# Patient Record
Sex: MALE | Race: WHITE | NOT HISPANIC OR LATINO | Employment: FULL TIME | URBAN - METROPOLITAN AREA
[De-identification: names, ages, dates, MRNs, and addresses within clinical notes are randomized per-mention and may not be internally consistent; named-entity substitution may affect disease eponyms.]

---

## 2018-09-05 ENCOUNTER — OFFICE VISIT (OUTPATIENT)
Dept: UROLOGY | Facility: CLINIC | Age: 48
End: 2018-09-05
Payer: COMMERCIAL

## 2018-09-05 ENCOUNTER — LAB VISIT (OUTPATIENT)
Dept: LAB | Facility: HOSPITAL | Age: 48
End: 2018-09-05
Attending: UROLOGY
Payer: COMMERCIAL

## 2018-09-05 VITALS — BODY MASS INDEX: 35.31 KG/M2 | HEIGHT: 71 IN | WEIGHT: 252.19 LBS

## 2018-09-05 DIAGNOSIS — Z12.5 SCREENING FOR PROSTATE CANCER: ICD-10-CM

## 2018-09-05 DIAGNOSIS — N52.9 IMPOTENCE: ICD-10-CM

## 2018-09-05 DIAGNOSIS — R35.0 INCREASED URINARY FREQUENCY: ICD-10-CM

## 2018-09-05 DIAGNOSIS — N40.1 ENLARGED PROSTATE WITH URINARY OBSTRUCTION: ICD-10-CM

## 2018-09-05 DIAGNOSIS — E29.1 HYPOGONADISM MALE: ICD-10-CM

## 2018-09-05 DIAGNOSIS — N13.8 ENLARGED PROSTATE WITH URINARY OBSTRUCTION: ICD-10-CM

## 2018-09-05 DIAGNOSIS — E29.1 HYPOGONADISM MALE: Primary | ICD-10-CM

## 2018-09-05 DIAGNOSIS — R35.1 NOCTURIA MORE THAN TWICE PER NIGHT: ICD-10-CM

## 2018-09-05 LAB
BILIRUB SERPL-MCNC: NORMAL MG/DL
BLOOD URINE, POC: NORMAL
COLOR, POC UA: YELLOW
COMPLEXED PSA SERPL-MCNC: 0.34 NG/ML
GLUCOSE UR QL STRIP: NORMAL
KETONES UR QL STRIP: NORMAL
LEUKOCYTE ESTERASE URINE, POC: NORMAL
NITRITE, POC UA: NORMAL
PH, POC UA: 6
PROTEIN, POC: NORMAL
SPECIFIC GRAVITY, POC UA: 1.02
TESTOST SERPL-MCNC: 223 NG/DL
UROBILINOGEN, POC UA: NORMAL

## 2018-09-05 PROCEDURE — 36415 COLL VENOUS BLD VENIPUNCTURE: CPT | Mod: PO

## 2018-09-05 PROCEDURE — 99204 OFFICE O/P NEW MOD 45 MIN: CPT | Mod: 25,S$GLB,, | Performed by: UROLOGY

## 2018-09-05 PROCEDURE — 81002 URINALYSIS NONAUTO W/O SCOPE: CPT | Mod: S$GLB,,, | Performed by: UROLOGY

## 2018-09-05 PROCEDURE — 99999 PR PBB SHADOW E&M-NEW PATIENT-LVL II: CPT | Mod: PBBFAC,,, | Performed by: UROLOGY

## 2018-09-05 PROCEDURE — 84403 ASSAY OF TOTAL TESTOSTERONE: CPT

## 2018-09-05 PROCEDURE — 3008F BODY MASS INDEX DOCD: CPT | Mod: CPTII,S$GLB,, | Performed by: UROLOGY

## 2018-09-05 PROCEDURE — 84153 ASSAY OF PSA TOTAL: CPT

## 2018-09-05 RX ORDER — LOSARTAN POTASSIUM AND HYDROCHLOROTHIAZIDE 12.5; 5 MG/1; MG/1
TABLET ORAL
Refills: 5 | COMMUNITY
Start: 2018-08-13 | End: 2019-07-15

## 2018-09-05 RX ORDER — IBUPROFEN 800 MG/1
800 TABLET ORAL EVERY 6 HOURS PRN
Refills: 0 | COMMUNITY
Start: 2018-06-22 | End: 2019-07-15 | Stop reason: ALTCHOICE

## 2018-09-05 RX ORDER — ALFUZOSIN HYDROCHLORIDE 10 MG/1
10 TABLET, EXTENDED RELEASE ORAL
Qty: 30 TABLET | Refills: 12 | Status: SHIPPED | OUTPATIENT
Start: 2018-09-05 | End: 2019-07-15 | Stop reason: ALTCHOICE

## 2018-09-05 RX ORDER — ESCITALOPRAM OXALATE 20 MG/1
20 TABLET ORAL
COMMUNITY
Start: 2017-06-26 | End: 2019-07-15 | Stop reason: ALTCHOICE

## 2018-09-05 RX ORDER — AMLODIPINE BESYLATE 5 MG/1
10 TABLET ORAL DAILY
COMMUNITY
Start: 2017-06-26 | End: 2020-12-02

## 2018-09-05 RX ORDER — NAPROXEN SODIUM 220 MG/1
TABLET, FILM COATED ORAL
COMMUNITY
Start: 2018-05-05 | End: 2020-12-02 | Stop reason: CLARIF

## 2018-09-05 NOTE — PROGRESS NOTES
Subjective:       Patient ID: Micha Bolton is a 48 y.o. male.    Chief Complaint: Flank Pain and Low Testosterone    HPI     48 year old with a history of low testosterone and kidney stones.   He was previously seen 3 years ago.  He was on IM testosterone very briefly..only 2 shots.  He has a history of low back pain but he complains of worsening urinary frequency.  He denies gross hematuria and dysuria.  His UA is clear.  Nocturia 3-4 but getting worse.  He complains of decreased energy .  ED has gotten worse.  OTC meds.  No family history of prostate cancer.  Urine dipstick shows negative for all components.    History reviewed. No pertinent past medical history.    History reviewed. No pertinent surgical history.      Current Outpatient Medications:     amLODIPine (NORVASC) 5 MG tablet, Take 5 mg by mouth., Disp: , Rfl:     aspirin 81 MG Chew, Chew 1 tablet(s) every day by oral route., Disp: , Rfl:     escitalopram oxalate (LEXAPRO) 20 MG tablet, Take 20 mg by mouth., Disp: , Rfl:     ibuprofen (ADVIL,MOTRIN) 800 MG tablet, Take 800 mg by mouth every 6 (six) hours as needed., Disp: , Rfl: 0    losartan-hydrochlorothiazide 50-12.5 mg (HYZAAR) 50-12.5 mg per tablet, TAKE 1 TABLET BY MOUTH 1 TIME A DAY, Disp: , Rfl: 5    alfuzosin (UROXATRAL) 10 mg Tb24, Take 1 tablet (10 mg total) by mouth daily with dinner or evening meal., Disp: 30 tablet, Rfl: 12      Review of Systems   Constitutional: Negative for fever.   Eyes: Negative for visual disturbance.   Respiratory: Negative for shortness of breath.    Cardiovascular: Negative for chest pain.   Gastrointestinal: Negative for nausea and vomiting.   Genitourinary: Positive for frequency. Negative for dysuria, flank pain and hematuria.   Musculoskeletal: Positive for gait problem.   Skin: Negative for rash.   Neurological: Negative for seizures.   Psychiatric/Behavioral: Negative for confusion.       Objective:      Physical Exam   Constitutional: He is  oriented to person, place, and time. He appears well-developed and well-nourished.   HENT:   Head: Normocephalic and atraumatic.   Eyes: Conjunctivae are normal.   Cardiovascular: Normal rate.   Pulmonary/Chest: Effort normal.   Abdominal: Hernia confirmed negative in the right inguinal area and confirmed negative in the left inguinal area.   Genitourinary: Testes normal and penis normal. Rectal exam shows no mass and anal tone normal. Prostate is enlarged (40g, s/s/a). Prostate is not tender.   Musculoskeletal: Normal range of motion. He exhibits no edema.   Neurological: He is alert and oriented to person, place, and time.   Skin: Skin is warm and dry. No rash noted.   Psychiatric: He has a normal mood and affect.   Vitals reviewed.      Assessment:       1. Hypogonadism male    2. Enlarged prostate with urinary obstruction    3. Increased urinary frequency    4. Nocturia more than twice per night    5. Screening for prostate cancer    6. Impotence        Plan:       Hypogonadism male  -     POCT URINE DIPSTICK WITHOUT MICROSCOPE  -     Testosterone; Future; Expected date: 09/05/2018    Enlarged prostate with urinary obstruction    Increased urinary frequency    Nocturia more than twice per night    Screening for prostate cancer  -     PSA, Screening; Future; Expected date: 09/05/2018    Impotence    Other orders  -     alfuzosin (UROXATRAL) 10 mg Tb24; Take 1 tablet (10 mg total) by mouth daily with dinner or evening meal.  Dispense: 30 tablet; Refill: 12      Baseline PSA and Testosterone level.  Rec Trial Uroxatral.   Will resume IM testosterone pending lab results.

## 2018-09-21 ENCOUNTER — TELEPHONE (OUTPATIENT)
Dept: UROLOGY | Facility: CLINIC | Age: 48
End: 2018-09-21

## 2018-09-21 DIAGNOSIS — E29.1 MALE HYPOGONADISM: Primary | ICD-10-CM

## 2018-09-21 NOTE — TELEPHONE ENCOUNTER
----- Message from Danay Pulido sent at 9/21/2018 11:05 AM CDT -----  Contact: patient  Patient requesting call back regarding test results. Please call [phone]

## 2018-09-24 ENCOUNTER — TELEPHONE (OUTPATIENT)
Dept: UROLOGY | Facility: CLINIC | Age: 48
End: 2018-09-24

## 2018-09-25 NOTE — TELEPHONE ENCOUNTER
Spoke to Dr. Gongora and got the verbal order for the testosterone. I will order on pt's nurse visit apt today. Not able to order ahead of time.

## 2018-09-25 NOTE — TELEPHONE ENCOUNTER
Pt had an office visit on 9/21/18. Your note states will start IM treatment pending the testosterone levels. Pt has a nurse visit apt for today.

## 2019-07-15 PROBLEM — M75.02 SECONDARY ADHESIVE CAPSULITIS OF LEFT SHOULDER: Status: ACTIVE | Noted: 2019-07-15

## 2019-07-15 PROBLEM — M19.012 ARTHRITIS OF LEFT SHOULDER REGION: Status: ACTIVE | Noted: 2019-07-15

## 2019-09-04 ENCOUNTER — TELEPHONE (OUTPATIENT)
Dept: PHYSICAL MEDICINE AND REHAB | Facility: CLINIC | Age: 49
End: 2019-09-04

## 2019-09-04 ENCOUNTER — INITIAL CONSULT (OUTPATIENT)
Dept: PHYSICAL MEDICINE AND REHAB | Facility: CLINIC | Age: 49
End: 2019-09-04
Payer: COMMERCIAL

## 2019-09-04 VITALS
WEIGHT: 245 LBS | HEART RATE: 70 BPM | DIASTOLIC BLOOD PRESSURE: 100 MMHG | HEIGHT: 71 IN | SYSTOLIC BLOOD PRESSURE: 161 MMHG | BODY MASS INDEX: 34.3 KG/M2

## 2019-09-04 DIAGNOSIS — G89.29 CHRONIC LEFT SHOULDER PAIN: Primary | ICD-10-CM

## 2019-09-04 DIAGNOSIS — M25.512 CHRONIC LEFT SHOULDER PAIN: Primary | ICD-10-CM

## 2019-09-04 PROCEDURE — 99999 PR PBB SHADOW E&M-EST. PATIENT-LVL III: ICD-10-PCS | Mod: PBBFAC,,, | Performed by: PHYSICAL MEDICINE & REHABILITATION

## 2019-09-04 PROCEDURE — 99243 OFF/OP CNSLTJ NEW/EST LOW 30: CPT | Mod: S$GLB,,, | Performed by: PHYSICAL MEDICINE & REHABILITATION

## 2019-09-04 PROCEDURE — 99999 PR PBB SHADOW E&M-EST. PATIENT-LVL III: CPT | Mod: PBBFAC,,, | Performed by: PHYSICAL MEDICINE & REHABILITATION

## 2019-09-04 PROCEDURE — 99243 PR OFFICE CONSULTATION,LEVEL III: ICD-10-PCS | Mod: S$GLB,,, | Performed by: PHYSICAL MEDICINE & REHABILITATION

## 2019-09-04 RX ORDER — LOSARTAN POTASSIUM AND HYDROCHLOROTHIAZIDE 12.5; 5 MG/1; MG/1
TABLET ORAL
Refills: 5 | COMMUNITY
Start: 2019-08-29 | End: 2020-02-18

## 2019-09-04 RX ORDER — TADALAFIL 20 MG/1
TABLET ORAL
Refills: 0 | COMMUNITY
Start: 2019-08-22 | End: 2020-12-02

## 2019-09-04 RX ORDER — LIDOCAINE HYDROCHLORIDE 10 MG/ML
1 INJECTION, SOLUTION EPIDURAL; INFILTRATION; INTRACAUDAL; PERINEURAL ONCE
Status: CANCELLED | OUTPATIENT
Start: 2019-09-04 | End: 2019-09-04

## 2019-09-04 RX ORDER — SODIUM CHLORIDE, SODIUM LACTATE, POTASSIUM CHLORIDE, CALCIUM CHLORIDE 600; 310; 30; 20 MG/100ML; MG/100ML; MG/100ML; MG/100ML
INJECTION, SOLUTION INTRAVENOUS CONTINUOUS
Status: CANCELLED | OUTPATIENT
Start: 2019-09-04

## 2019-09-04 RX ORDER — MIDAZOLAM HYDROCHLORIDE 1 MG/ML
2 INJECTION INTRAMUSCULAR; INTRAVENOUS ONCE AS NEEDED
Status: CANCELLED | OUTPATIENT
Start: 2019-09-04 | End: 2031-01-31

## 2019-09-04 NOTE — TELEPHONE ENCOUNTER
"Patient called [facility] who told him that the codes for the dx block and the ablation for the knee are covered under his policy. But he states they need someone from the office to call the ins company and go through the whole approval process with them. I explained to him that [employer] does not cover these procedures. They simply do not cover them for the peripheral joints. He states he wants me to go through the approval process then let them deny it then he can get on the phone with someone. I explained to him that [employer] will not under any policy cover these procedures and I gave him the cash price for the procedure because we already know they will not approve the procedure. He stated again that he wants me to get the approval started so that he can get on the phone with them when they deny the procedure. I explained to him again that his ins simply does not cover these procedures. He stated "I guess we won't do anything" and hung up  "

## 2019-09-04 NOTE — LETTER
September 4, 2019      [place]acio [place]  [id number] [address] [facility]  [facility] [address]           [facility]  [address] [facility] [address]  Phone: [phone]  Fax: [phone]          Patient: Micha Bolton   MR Number: [id number]   YOB: 1970   Date of Visit: 9/4/2019       Dear Dr. Horacio Rodríguez II:    Thank you for referring Micha Bolton to me for evaluation. Attached you will find relevant portions of my assessment and plan of care.    If you have questions, please do not hesitate to call me. I look forward to following Micha Bolton along with you.    Sincerely,    Raghu Lala MD    Enclosure  CC:  No Recipients    If you would like to receive this communication electronically, please contact [email or web address] or [phone] to request more information on [employer] access.    For providers and/or their staff who would like to refer a patient to [facility], please contact us through our one-stop-shop provider referral line, [facility], at [phone].    If you feel you have received this communication in error or would no longer like to receive these types of communications, please e-mail [email or web address]

## 2019-09-04 NOTE — TELEPHONE ENCOUNTER
I explained to patient earlier that we submit this for surgery and the referral dept gets the auth for the procedures. I cannot get on the phone with ins. Company while in clinic. I submitted this for the Sept 13th to get approval or denial from ins. company

## 2019-09-04 NOTE — PROGRESS NOTES
[facility]    Consulting Provider: Horacio Rodríguez II, *    CHIEF COMPLAINT:   Chief Complaint   Patient presents with    Shoulder Pain     left shoudler pain     HISTORY OF PRESENT ILLNESS: Micha Bolton is a 49 y.o. male who presents to me for evaluation of Left shoulder pain. He is left-handed. He had a posterior labral tear that was repaired in February by Dr. Thompson. He completed PT in April, but he continues to have 7-8/10 pain in his L shoulder as well as significantly reduced ROM. He was seen by Dr. Rodríguez in July where he was found to have OA of L shoulder as well as adhesive capsulitis. He was given a steroid injection and referred to PT. He reports minimal, if any, improvement following injection and has not returned to PT. He was referred for evaluation for possible suprascapular nerve block.     Review of Systems   Constitutional: Negative for fever.   HENT: Negative for drooling.    Eyes: Negative for discharge.   Respiratory: Negative for choking.    Cardiovascular: Negative for chest pain.   Genitourinary: Negative for flank pain.   Skin: Negative for wound.   Allergic/Immunologic: Negative for immunocompromised state.   Neurological: Negative for tremors and syncope.   Psychiatric/Behavioral: Negative for behavioral problems.     Past Medical History:   Past Medical History:   Diagnosis Date    Hypertension        Past Surgical History:   Past Surgical History:   Procedure Laterality Date    LAPAROSCOPIC REPAIR OF INGUINAL HERNIA Bilateral 2009    SHOULDER SURGERY Left 02/18/2019    posterior labral repair; extensive debridement of synovium and cuff with SAD       Family History: History reviewed. No pertinent family history.    Medications:   Current Outpatient Medications on File Prior to Visit   Medication Sig Dispense Refill    amLODIPine (NORVASC) 5 MG tablet Take 5 mg by mouth.      aspirin 81 MG Chew Chew 1 tablet(s) every day by oral route.       "losartan-hydrochlorothiazide 50-12.5 mg (HYZAAR) 50-12.5 mg per tablet TAKE 1 TABLET BY MOUTH 1 TIME A DAY  5    sertraline (ZOLOFT) 25 MG tablet Take 25 mg by mouth once daily.  3    tadalafil (CIALIS) 20 MG Tab TAKE 1 TABLET BY MOUTH 1 TIME A DAY as needed for 3 days  0    valsartan-hydrochlorothiazide (DIOVAN-HCT) 160-12.5 mg per tablet Take 1 tablet by mouth.       No current facility-administered medications on file prior to visit.        Allergies:   Review of patient's allergies indicates:   Allergen Reactions    Pcn [penicillins]        Social History:   Social History     Socioeconomic History    Marital status:      Spouse name: Not on file    Number of children: Not on file    Years of education: Not on file    Highest education level: Not on file   Occupational History    Not on file   Social Needs    Financial resource strain: Not on file    Food insecurity:     Worry: Not on file     Inability: Not on file    Transportation needs:     Medical: Not on file     Non-medical: Not on file   Tobacco Use    Smoking status: Never Smoker    Smokeless tobacco: Never Used   Substance and Sexual Activity    Alcohol use: No     Frequency: Never    Drug use: No    Sexual activity: Yes     Partners: Female   Lifestyle    Physical activity:     Days per week: Not on file     Minutes per session: Not on file    Stress: Not on file   Relationships    Social connections:     Talks on phone: Not on file     Gets together: Not on file     Attends Latter day service: Not on file     Active member of club or organization: Not on file     Attends meetings of clubs or organizations: Not on file     Relationship status: Not on file   Other Topics Concern    Not on file   Social History Narrative    Not on file     PHYSICAL EXAMINATION:   General    Vitals:    09/04/19 0735   BP: (!) 161/100   Pulse: 70   Weight: 111.1 kg (245 lb)   Height: 5' 11" (1.803 m)     Constitutional: Oriented to person, " place, and time. No apparent distress. Pleasant.  HENT:   Head: Normocephalic and atraumatic.   Eyes: Right eye exhibits no discharge. Left eye exhibits no discharge. No scleral icterus.   Pulmonary/Chest: Effort normal. No respiratory distress.   Abdominal: There is no guarding.   Neurological: Alert and oriented to person, place, and time.   Psychiatric: Behavior is normal.   Left Shoulder Exam     Tenderness   Left shoulder tenderness location: Mild tenderness about the posterior shoulder girdle.    Range of Motion   Active abduction: 80   Left shoulder passive abduction: 45.   Left shoulder external rotation: 5.   Forward flexion: 80   Internal rotation 90 degrees: 10     Muscle Strength   Abduction: 4/5   Internal rotation: 4/5   External rotation: 4/5   Biceps: 5/5     Other   Erythema: absent  Scars: present  Sensation: normal  Pulse: present     Comments:  Special tests limited by significant loss of range of motion        INSPECTION: There is no swelling, ecchymoses, erythema or gross deformity about the left shoulder.    Imaging  X-ray of the Left shoulder from 7/15/19:     1. No fracture is identified.    2. There are degenerative changes present with findings consisting of osteophyte formation, joint space narrowing, and subchondral cyst formation.    3. There also is some irregularity of the joint surfaces.    MRI of the Left shoulder from 2/1/19:    1. There is hypertrophic degeneration about the AC joint.  2. The rotator cuff musculature is grossly intact.  3. There is some labral tearing appearance both anterior and more pronounced posteriorly with adjacent cortical irregularity indicative of previous osseous remote injury mid to inferior aspect of the glenoid predominantly.  No acute osseous signal is currently appreciated.  Correlate if there is history of previous injury or dislocation.  4. There is glenohumeral joint space narrowing and spurring posterior inferiorly predominant.    Data Reviewed:  "X-ray, MRI    Supportive Actions: Independent visualization of images or test specimens    ASSESSMENT:   1. Chronic shoulder pain, unspecified laterality      PLAN:     1. Time was spent reviewing the above diagnosis in depth with Micha mascorro, including acute management and rehabilitation.     2. Recommended diagnostic suprascapular nerve block to evaluate for possible nerve ablation. Unfortunately, patient has [employer] insurance, and these procedures may not be covered. He is interested in having them performed as they would likely help to relieve his pain, help to accelerate his progress with PT in terms of continuing to improve his strength and ROM, and may reduce his need for pain medicine or further surgical procedures.     3. He would like to speak to his insurance company to check coverage and will contact us after doing so to make a follow-up appointment.  He was also issued the out-of-pocket cost should his insurance not pay for the procedure.    This is a consult from Dr. Horacio Rodríguez II. Please see the "Communications" section of Epic to see how the consulting physician received the report of today's findings and recommendations. If it's an [facility] physician, it will be forwarded to his/her "in basket".    The above note was completed, in part, with the aid of Dragon dictation software/hardware. Translation errors may be present.    "

## 2019-09-04 NOTE — TELEPHONE ENCOUNTER
----- Message from Cheyenne Meza sent at 9/4/2019  8:48 AM CDT -----  Contact: Sharron najera/ANGÉLICA Mcdermott is requesting a call back to get a diagnoses code for the procedure the patient is wanting to schedule.  Call back at [phone].  Thanks

## 2019-09-12 ENCOUNTER — TELEPHONE (OUTPATIENT)
Dept: SURGERY | Facility: HOSPITAL | Age: 49
End: 2019-09-12

## 2019-09-12 NOTE — TELEPHONE ENCOUNTER
When doing pre op call, patient stated that he thought he was supposed to hear from the office about his insurance approval for this procedure. He states he never was contacted, so he would be unable to make the procedure tomorrow. He asked that someone from the office contact him to reschedule and work with him and his insurance. Thank you

## 2020-02-18 PROBLEM — M19.012 OSTEOARTHRITIS OF LEFT SHOULDER: Status: ACTIVE | Noted: 2020-02-18

## 2020-12-17 PROBLEM — M19.012 PRIMARY LOCALIZED OSTEOARTHROSIS OF LEFT SHOULDER: Status: ACTIVE | Noted: 2020-12-17

## 2020-12-29 PROBLEM — Z96.612 STATUS POST TOTAL SHOULDER ARTHROPLASTY, LEFT: Status: ACTIVE | Noted: 2020-12-29

## 2021-01-29 ENCOUNTER — EXTERNAL HOME HEALTH (OUTPATIENT)
Dept: HOME HEALTH SERVICES | Facility: HOSPITAL | Age: 51
End: 2021-01-29

## 2021-05-10 ENCOUNTER — PATIENT MESSAGE (OUTPATIENT)
Dept: RESEARCH | Facility: HOSPITAL | Age: 51
End: 2021-05-10

## 2021-07-21 PROBLEM — K80.20 GALLSTONES: Status: ACTIVE | Noted: 2021-07-21

## 2023-03-17 PROBLEM — M19.011 PRIMARY OSTEOARTHRITIS OF RIGHT SHOULDER: Status: ACTIVE | Noted: 2023-03-17

## 2023-05-08 ENCOUNTER — OFFICE VISIT (OUTPATIENT)
Dept: OTOLARYNGOLOGY | Facility: CLINIC | Age: 53
End: 2023-05-08
Payer: COMMERCIAL

## 2023-05-08 VITALS — HEIGHT: 71 IN | WEIGHT: 233.69 LBS | BODY MASS INDEX: 32.72 KG/M2

## 2023-05-08 DIAGNOSIS — Z78.9 INTOLERANCE OF CONTINUOUS POSITIVE AIRWAY PRESSURE (CPAP) VENTILATION: ICD-10-CM

## 2023-05-08 DIAGNOSIS — G47.33 OSA (OBSTRUCTIVE SLEEP APNEA): Primary | ICD-10-CM

## 2023-05-08 PROCEDURE — 1160F PR REVIEW ALL MEDS BY PRESCRIBER/CLIN PHARMACIST DOCUMENTED: ICD-10-PCS | Mod: CPTII,S$GLB,, | Performed by: OTOLARYNGOLOGY

## 2023-05-08 PROCEDURE — 99999 PR PBB SHADOW E&M-EST. PATIENT-LVL IV: CPT | Mod: PBBFAC,,, | Performed by: OTOLARYNGOLOGY

## 2023-05-08 PROCEDURE — 99204 PR OFFICE/OUTPT VISIT, NEW, LEVL IV, 45-59 MIN: ICD-10-PCS | Mod: S$GLB,,, | Performed by: OTOLARYNGOLOGY

## 2023-05-08 PROCEDURE — 99204 OFFICE O/P NEW MOD 45 MIN: CPT | Mod: S$GLB,,, | Performed by: OTOLARYNGOLOGY

## 2023-05-08 PROCEDURE — 3008F PR BODY MASS INDEX (BMI) DOCUMENTED: ICD-10-PCS | Mod: CPTII,S$GLB,, | Performed by: OTOLARYNGOLOGY

## 2023-05-08 PROCEDURE — 3008F BODY MASS INDEX DOCD: CPT | Mod: CPTII,S$GLB,, | Performed by: OTOLARYNGOLOGY

## 2023-05-08 PROCEDURE — 1159F PR MEDICATION LIST DOCUMENTED IN MEDICAL RECORD: ICD-10-PCS | Mod: CPTII,S$GLB,, | Performed by: OTOLARYNGOLOGY

## 2023-05-08 PROCEDURE — 99999 PR PBB SHADOW E&M-EST. PATIENT-LVL IV: ICD-10-PCS | Mod: PBBFAC,,, | Performed by: OTOLARYNGOLOGY

## 2023-05-08 PROCEDURE — 1160F RVW MEDS BY RX/DR IN RCRD: CPT | Mod: CPTII,S$GLB,, | Performed by: OTOLARYNGOLOGY

## 2023-05-08 PROCEDURE — 1159F MED LIST DOCD IN RCRD: CPT | Mod: CPTII,S$GLB,, | Performed by: OTOLARYNGOLOGY

## 2023-05-08 NOTE — H&P (VIEW-ONLY)
Subjective:       Patient ID: Micha Bolton is a 52 y.o. male.    Chief Complaint: Vik Muse is here for Obstructive sleep apnea and intolerance of CPAP.  Has had HARSHA for years.   Last sleep study: 5/2022. KATHERINE / AHI: 33; oxygen mariluz: 80%  Occurred in all positions.  he has issues with CPAP compliance: he cannot initiate or maintain sleep. Has tried different pressure setting and masks. He has followed with Dr. Piper.  Previous surgical treatments for sleep apnea: none  Body mass index is 32.59 kg/m².      Pertinent medical issues: htn  Anticoagulation: none  Pertinent surgery: none    Patient validated questionnaires (if applicable):      %       No flowsheet data found.  No flowsheet data found.  No flowsheet data found.         Social History     Tobacco Use   Smoking Status Never   Smokeless Tobacco Never     Social History     Substance and Sexual Activity   Alcohol Use Yes    Alcohol/week: 1.0 standard drink    Types: 1 Glasses of wine per week          Objective:        Constitutional:   He is oriented to person, place, and time. He appears well-developed and well-nourished. He appears alert. He is active. Normal speech.      Head:  Normocephalic and atraumatic. Head is without TMJ tenderness. No scars. Salivary glands normal.  Facial strength is normal.      Ears:    Right Ear: No drainage or swelling. No middle ear effusion.   Left Ear: No drainage or swelling.  No middle ear effusion.     Nose:  Septal deviation present. No mucosal edema, rhinorrhea or sinus tenderness. No turbinate hypertrophy.      Mouth/Throat  Oropharynx clear and moist without lesions or asymmetry, normal uvula midline and mirror exam normal. Normal dentition. No uvula swelling, lacerations or trismus. No oropharyngeal exudate. Tonsils present, +1.  Tonsillar erythema, tonsillar exudate.    Guillory II  Difficulty mirror      Neck:  Full range of motion with neck supple and no adenopathy. Thyroid tenderness is  present. No tracheal deviation, no edema, no erythema, normal range of motion, no stridor, no crepitus and no neck rigidity present. No thyroid mass present.     Cardiovascular:    Intact distal pulses and normal pulses.              Pulmonary/Chest:   Effort normal and breath sounds normal. No stridor.     Psychiatric:   His speech is normal and behavior is normal. His mood appears not anxious. His affect is not labile.     Neurological:   He is alert and oriented to person, place, and time. No sensory deficit.     Skin:   No abrasions, lacerations, lesions, or rashes. No abrasion and no bruising noted.       Tests / Results:  Reviewed sleep study from [facility] independently, as noted above     Assessment:       1. HARSHA (obstructive sleep apnea)    2. Intolerance of continuous positive airway pressure (CPAP) ventilation    3. BMI 32.0-32.9,adult          Plan:         Discussed background of HARSHA at length including medical therapy and surgical therapy    Patient interested in [facility]  Few lb weight loss to get BMI < 32  Discussed   DISE (drug induced sleep endoscopy)    I discussed the risks of hypoglossal nerve stimulation including: scar, bleeding, numbness of incision, numbness or weakness of tongue or marginal mandibular nerve, irritation of tongue from stimulation, implant failure, inadequate improvement, need for further procedures, need for removal of implant, infection, pneumothorax, MRI incompatibility.

## 2023-05-08 NOTE — PATIENT INSTRUCTIONS
Information regarding Hypoglossal Nerve Stimulation Therapy:    Hypoglossal Nerve Stimulation Therapy (HGNS) is a surgical treatment for sleep apnea when a patient fails standard positive pressure (CPAP) therapy. This is an effective surgical therapy for sleep apnea with long term effectiveness in many patients.    What is HGNS and how does it work?  The hypoglossal nerve is the nerve that controls nearly all of the tongue movements.   During sleep, the tongue will often fall into the back of the throat. This causes obstruction and apneas by narrowing the throat.  By targeting the tongue muscles, we can often control the prevention of tongue collapse during sleep thereby preventing collapse and apnea.  This procedure involves inserting an implant over the muscles of the chest wall (similar to a pacemaker.) This implant is connected to the nerve that moves the tongue forward. During sleep, the implant will detect breathing effort, and during inspiration, it will cause a gentle pulse of the tongue forward to prevent collapse.   The machine can be turned on and off, and it can be dialed up or down depending on strength needed to move the tongue forward.    Will I feel it? Will it affect my sleep at night?  Once the implant is in place, it is activated after 1 month. After this, you will begin increasing the settings to find the most comfortable setting for you. We work hard with you to get the right settings.   A majority of patients are not disturbed at night with the device, and satisfaction is extremely high (95%.) This is especially true when compared to wearing an external device (CPAP.)   A post-titration sleep study is typically obtained about 3 months after surgery to assess response.     Am I a Candidate?  Candidacy for HGNS is changing over time; however, current general candidacy requirements are:  Moderate or Severe Sleep Apnea (AHI 15-65) as measured by a recent sleep study  Body Mass Index (BMI) < 35    Failure to tolerate CPAP therapy  Pre-operative endoscopy exam confirming candidacy (performed during a quick outpatient procedure)  No contraindications to implant  Approval by insurance company  **The current device is currently under conditional acceptance for use with MRI of the thoracic, shoulder, or abdomen area.    How is the surgery performed:  The surgery is usually performed in an outpatient setting, under general anesthesia.  The procedure generally takes a few hours.  The procedure involved two incisions: one in the neck, just below the jaw line. The other is in the chest between the 2nd and third rib. The surgical scars generally heal very well and are minimally noticeable.   Please let your doctor know if you have had surgery or significant trauma to the chest or neck.     What are the risks?  General surgical risks, similar to any procedure include  Bleeding or hematoma  Numbness over incision site  Incisional scar  Pain over incision site  Infection of implant requiring revision or replacement (<1%)  Intolerance to the therapy resulting in non-usage    Specific risks to this surgery include:  Temporary tongue weakness or tingling, rarely permanent (< 1%)  Mechanical failure of the implant  Temporary or permanent weakness to the muscles of the lip (< 1%)  Stimulation related discomfort or tongue abrasions (8%)  Pneumothorax (dropped lung)    After care:  Instructions will be given following the surgery  Generally, light activity for 2 weeks is recommended.  Blood thinners are held prior to surgery at the discretion of the surgeon and can usually be resumed within 48 hours.     Post-operative timeline:    WEEK 1:  About 7 days after your procedure, you will return to the office for a follow up visit. At this time any surtures that we placed will be removed and your incisions will be checked by the pysician. Please note that device will remain inactive for ONE MONTH. This is refered to as your  healing phase. We want to give your body time to heal before we turn the device on. During this time it would be benefical for you to watch the vidoes on the inspire neil. Simply download the neil if you have not done so, then click on the RESOURCES tab. From there, scrol to the INSPIRE CARE section. There are vidoes here that walk you though this phase and will prepare you for the activation visit. Focus on the vidoes entitled: Rest and Heal, Learning your sleep remote, and Tuning Inspire. This will help you feel educated and empowered for your visit.     WEEK 4:  At the one month visit you will come to the office for the device activation. Please wear a shirt or blouse that can allow the physician to check all the incisions and access the implant. Your physician will use a  to check the device for proper function and turn it on for you to begin using Inspire therapy. The process of activation is NOT painful. At this visit you will be given your remote control and instructions on how to use. You will return home to begin using your therapy with the goal of using it ALL NIGHT, EVERY NIGHT. If you have any issues with discomfort, please call the office so we can help you reach this goal.    WEEK 6-8:  Someone from our office will be doing a check in call to see how you are progressing. If you are having any issues, we can bring you in to the office and make some appropratie adjustments as needed. Otherwise, you will continue to keep stepping up your therapy and using it all night.     WEEK 12:  Roughly 3 months after your device is turned on, you will return to the sleep lab. This will help us determine if your therapy is at the appropriate level for you. A couple of weeks after your sleep study, you will return to the office to review your results and make any changes that your provider feels necessary.       FAQ:  I don't feel the stimulation?   Answer: During the tuning phase, you will get used to  stimulation.  It's very common to not feel stimulation.  Try stepping up your level.    The stimulation does not synchronize with my breathing?    Answer: This is normal.  Stimulation timing is designed to work best while you are asleep.  Your breathing pattern is much different while you are awake.  When you go to sleep, the sensor will work normally.     Can I receive an MRI?    Answer: If you have the model 3028 generator it is MRI Conditional, make sure your doctor goes to the Inspire website for more information on how to perform a safe MRI for you.     Can I go to the airport?    Answer: Yes.  You may go through metal detectors and scanners.  Make sure to show the TSA official your medical device registration card and tell them you have an implantable device in your body.  They may require extra screening, so make sure to plan for extra time your first time going to the airport.     Can I go scuba diving and mountain climbing?    Answer: You can go to as high in elevation as you want.  However, you can only go 30 meters below sea level or 4 atmospheres of pressure.     Can I continue welding?    Answer: Inspire does not recommend electrical welding.  Consult your patient manual for more details.    Can I go to the dentist?    Answer: Going to the dentist is perfectly fine. Let your dentist know you have an implantable device and the dentist will take any precautions needed.     Can I receive X-Rays or CT Scans?    Answer: Yes.  X-Ray and CT Scans are safe with Inspire.  If you are having a mammogram though, make sure to let the technician know where your device is implanted so the technician can make the mammogram as comfortable as possible.     Why is stimulation on when I'm awake?    Answer: This is normal.  Inspire works by stimulating during every breath.  Inspire does not know when you are asleep or awake, so once you turn on Inspire, it will stimulate with each breath. Remember, you are in complete  control of your Inspire and can turn Inspire ON or OFF using your Inspire Sleep Remote.     Why is stimulation off when I wake up?    Answer: You may have slept longer than the Therapy Duration Setting, which by default is 8 hours.  Inspire will turn off automatically after 8 hours.  If you are sleeping longer than 8 hours, ask your physician to change the duration setting to a longer time (for example 9 or 10 hours).  It is also possible that you are getting used to Inspire and just don't feel the stimulation when you wake up.  If you're sleep remote is green when you gently shake it, your Inspire is still on.  If your Inspire is white, then it is no longer stimulating.       More information can be found at:  Www.[email or web address].com

## 2023-05-08 NOTE — PROGRESS NOTES
Subjective:       Patient ID: Micha Bolton is a 52 y.o. male.    Chief Complaint: Vik Muse is here for Obstructive sleep apnea and intolerance of CPAP.  Has had HARSHA for years.   Last sleep study: 5/2022. KATHERINE / AHI: 33; oxygen mariluz: 80%  Occurred in all positions.  he has issues with CPAP compliance: he cannot initiate or maintain sleep. Has tried different pressure setting and masks. He has followed with Dr. Piper.  Previous surgical treatments for sleep apnea: none  Body mass index is 32.59 kg/m².      Pertinent medical issues: htn  Anticoagulation: none  Pertinent surgery: none    Patient validated questionnaires (if applicable):      %       No flowsheet data found.  No flowsheet data found.  No flowsheet data found.         Social History     Tobacco Use   Smoking Status Never   Smokeless Tobacco Never     Social History     Substance and Sexual Activity   Alcohol Use Yes    Alcohol/week: 1.0 standard drink    Types: 1 Glasses of wine per week          Objective:        Constitutional:   He is oriented to person, place, and time. He appears well-developed and well-nourished. He appears alert. He is active. Normal speech.      Head:  Normocephalic and atraumatic. Head is without TMJ tenderness. No scars. Salivary glands normal.  Facial strength is normal.      Ears:    Right Ear: No drainage or swelling. No middle ear effusion.   Left Ear: No drainage or swelling.  No middle ear effusion.     Nose:  Septal deviation present. No mucosal edema, rhinorrhea or sinus tenderness. No turbinate hypertrophy.      Mouth/Throat  Oropharynx clear and moist without lesions or asymmetry, normal uvula midline and mirror exam normal. Normal dentition. No uvula swelling, lacerations or trismus. No oropharyngeal exudate. Tonsils present, +1.  Tonsillar erythema, tonsillar exudate.    Guillory II  Difficulty mirror      Neck:  Full range of motion with neck supple and no adenopathy. Thyroid tenderness is  present. No tracheal deviation, no edema, no erythema, normal range of motion, no stridor, no crepitus and no neck rigidity present. No thyroid mass present.     Cardiovascular:    Intact distal pulses and normal pulses.              Pulmonary/Chest:   Effort normal and breath sounds normal. No stridor.     Psychiatric:   His speech is normal and behavior is normal. His mood appears not anxious. His affect is not labile.     Neurological:   He is alert and oriented to person, place, and time. No sensory deficit.     Skin:   No abrasions, lacerations, lesions, or rashes. No abrasion and no bruising noted.       Tests / Results:  Reviewed sleep study from [facility] independently, as noted above     Assessment:       1. HARSHA (obstructive sleep apnea)    2. Intolerance of continuous positive airway pressure (CPAP) ventilation    3. BMI 32.0-32.9,adult          Plan:         Discussed background of HARSHA at length including medical therapy and surgical therapy    Patient interested in Inspire  Few lb weight loss to get BMI < 32  Discussed   DISE (drug induced sleep endoscopy)    I discussed the risks of hypoglossal nerve stimulation including: scar, bleeding, numbness of incision, numbness or weakness of tongue or marginal mandibular nerve, irritation of tongue from stimulation, implant failure, inadequate improvement, need for further procedures, need for removal of implant, infection, pneumothorax, MRI incompatibility.

## 2023-06-01 ENCOUNTER — ANESTHESIA EVENT (OUTPATIENT)
Dept: SURGERY | Facility: HOSPITAL | Age: 53
End: 2023-06-01
Payer: COMMERCIAL

## 2023-06-02 ENCOUNTER — HOSPITAL ENCOUNTER (OUTPATIENT)
Facility: HOSPITAL | Age: 53
Discharge: HOME | End: 2023-06-02
Attending: OTOLARYNGOLOGY | Admitting: OTOLARYNGOLOGY
Payer: COMMERCIAL

## 2023-06-02 ENCOUNTER — ANESTHESIA (OUTPATIENT)
Dept: SURGERY | Facility: HOSPITAL | Age: 53
End: 2023-06-02
Payer: COMMERCIAL

## 2023-06-02 VITALS
DIASTOLIC BLOOD PRESSURE: 85 MMHG | RESPIRATION RATE: 16 BRPM | OXYGEN SATURATION: 97 % | HEIGHT: 71 IN | BODY MASS INDEX: 32.62 KG/M2 | HEART RATE: 72 BPM | TEMPERATURE: 98 F | WEIGHT: 233 LBS | SYSTOLIC BLOOD PRESSURE: 128 MMHG

## 2023-06-02 DIAGNOSIS — Z78.9 INTOLERANCE OF CONTINUOUS POSITIVE AIRWAY PRESSURE (CPAP) VENTILATION: ICD-10-CM

## 2023-06-02 DIAGNOSIS — G47.33 OSA (OBSTRUCTIVE SLEEP APNEA): Primary | ICD-10-CM

## 2023-06-02 PROCEDURE — 63600175 PHARM REV CODE 636 W HCPCS: Mod: PO | Performed by: NURSE ANESTHETIST, CERTIFIED REGISTERED

## 2023-06-02 PROCEDURE — D9220A PRA ANESTHESIA: Mod: ANES,,, | Performed by: ANESTHESIOLOGY

## 2023-06-02 PROCEDURE — 71000015 HC POSTOP RECOV 1ST HR: Mod: PO | Performed by: OTOLARYNGOLOGY

## 2023-06-02 PROCEDURE — 36000709 HC OR TIME LEV III EA ADD 15 MIN: Mod: PO | Performed by: OTOLARYNGOLOGY

## 2023-06-02 PROCEDURE — 37000008 HC ANESTHESIA 1ST 15 MINUTES: Mod: PO | Performed by: OTOLARYNGOLOGY

## 2023-06-02 PROCEDURE — 25000003 PHARM REV CODE 250: Mod: PO | Performed by: NURSE ANESTHETIST, CERTIFIED REGISTERED

## 2023-06-02 PROCEDURE — 37000009 HC ANESTHESIA EA ADD 15 MINS: Mod: PO | Performed by: OTOLARYNGOLOGY

## 2023-06-02 PROCEDURE — 25000003 PHARM REV CODE 250: Mod: PO | Performed by: OTOLARYNGOLOGY

## 2023-06-02 PROCEDURE — 36000708 HC OR TIME LEV III 1ST 15 MIN: Mod: PO | Performed by: OTOLARYNGOLOGY

## 2023-06-02 PROCEDURE — D9220A PRA ANESTHESIA: ICD-10-PCS | Mod: ANES,,, | Performed by: ANESTHESIOLOGY

## 2023-06-02 PROCEDURE — 42975 DISE EVAL SLP DO BRTH FLX DX: CPT | Mod: ,,, | Performed by: OTOLARYNGOLOGY

## 2023-06-02 PROCEDURE — 63600175 PHARM REV CODE 636 W HCPCS: Mod: PO | Performed by: ANESTHESIOLOGY

## 2023-06-02 PROCEDURE — D9220A PRA ANESTHESIA: ICD-10-PCS | Mod: CRNA,,, | Performed by: NURSE ANESTHETIST, CERTIFIED REGISTERED

## 2023-06-02 PROCEDURE — 71000033 HC RECOVERY, INTIAL HOUR: Mod: PO | Performed by: OTOLARYNGOLOGY

## 2023-06-02 PROCEDURE — D9220A PRA ANESTHESIA: Mod: CRNA,,, | Performed by: NURSE ANESTHETIST, CERTIFIED REGISTERED

## 2023-06-02 PROCEDURE — 42975 PR SLEEP ENDOSCOPY, DRUG-INDUCED, W/EVAL OF SLEEP DISORD BREATH: ICD-10-PCS | Mod: ,,, | Performed by: OTOLARYNGOLOGY

## 2023-06-02 RX ORDER — LIDOCAINE HYDROCHLORIDE 10 MG/ML
1 INJECTION, SOLUTION EPIDURAL; INFILTRATION; INTRACAUDAL; PERINEURAL ONCE
Status: DISCONTINUED | OUTPATIENT
Start: 2023-06-02 | End: 2023-06-02 | Stop reason: HOSPADM

## 2023-06-02 RX ORDER — PROPOFOL 10 MG/ML
VIAL (ML) INTRAVENOUS CONTINUOUS PRN
Status: DISCONTINUED | OUTPATIENT
Start: 2023-06-02 | End: 2023-06-02

## 2023-06-02 RX ORDER — PROPOFOL 10 MG/ML
VIAL (ML) INTRAVENOUS
Status: DISCONTINUED | OUTPATIENT
Start: 2023-06-02 | End: 2023-06-02

## 2023-06-02 RX ORDER — LIDOCAINE HYDROCHLORIDE 20 MG/ML
INJECTION INTRAVENOUS
Status: DISCONTINUED | OUTPATIENT
Start: 2023-06-02 | End: 2023-06-02

## 2023-06-02 RX ORDER — SODIUM CHLORIDE, SODIUM LACTATE, POTASSIUM CHLORIDE, CALCIUM CHLORIDE 600; 310; 30; 20 MG/100ML; MG/100ML; MG/100ML; MG/100ML
INJECTION, SOLUTION INTRAVENOUS CONTINUOUS
Status: DISCONTINUED | OUTPATIENT
Start: 2023-06-02 | End: 2023-06-02 | Stop reason: HOSPADM

## 2023-06-02 RX ORDER — FENTANYL CITRATE 50 UG/ML
25 INJECTION, SOLUTION INTRAMUSCULAR; INTRAVENOUS EVERY 5 MIN PRN
Status: DISCONTINUED | OUTPATIENT
Start: 2023-06-02 | End: 2023-06-02 | Stop reason: HOSPADM

## 2023-06-02 RX ORDER — ONDANSETRON 2 MG/ML
4 INJECTION INTRAMUSCULAR; INTRAVENOUS ONCE AS NEEDED
Status: DISCONTINUED | OUTPATIENT
Start: 2023-06-02 | End: 2023-06-02 | Stop reason: HOSPADM

## 2023-06-02 RX ORDER — OXYCODONE HYDROCHLORIDE 5 MG/1
5 TABLET ORAL ONCE AS NEEDED
Status: DISCONTINUED | OUTPATIENT
Start: 2023-06-02 | End: 2023-06-02 | Stop reason: HOSPADM

## 2023-06-02 RX ORDER — LIDOCAINE HYDROCHLORIDE 20 MG/ML
JELLY TOPICAL ONCE
Status: COMPLETED | OUTPATIENT
Start: 2023-06-02 | End: 2023-06-02

## 2023-06-02 RX ORDER — ONDANSETRON 2 MG/ML
INJECTION INTRAMUSCULAR; INTRAVENOUS
Status: DISCONTINUED | OUTPATIENT
Start: 2023-06-02 | End: 2023-06-02

## 2023-06-02 RX ORDER — OXYMETAZOLINE HCL 0.05 %
2 SPRAY, NON-AEROSOL (ML) NASAL
Status: COMPLETED | OUTPATIENT
Start: 2023-06-02 | End: 2023-06-02

## 2023-06-02 RX ORDER — SODIUM CHLORIDE, SODIUM LACTATE, POTASSIUM CHLORIDE, CALCIUM CHLORIDE 600; 310; 30; 20 MG/100ML; MG/100ML; MG/100ML; MG/100ML
125 INJECTION, SOLUTION INTRAVENOUS CONTINUOUS
Status: DISCONTINUED | OUTPATIENT
Start: 2023-06-02 | End: 2023-06-02 | Stop reason: HOSPADM

## 2023-06-02 RX ADMIN — SODIUM CHLORIDE, POTASSIUM CHLORIDE, SODIUM LACTATE AND CALCIUM CHLORIDE: 600; 310; 30; 20 INJECTION, SOLUTION INTRAVENOUS at 02:06

## 2023-06-02 RX ADMIN — LIDOCAINE HYDROCHLORIDE: 20 JELLY TOPICAL at 02:06

## 2023-06-02 RX ADMIN — PROPOFOL 100 MCG/KG/MIN: 10 INJECTION, EMULSION INTRAVENOUS at 03:06

## 2023-06-02 RX ADMIN — GLYCOPYRROLATE 0.2 MG: 0.2 INJECTION, SOLUTION INTRAMUSCULAR; INTRAVENOUS at 03:06

## 2023-06-02 RX ADMIN — NASAL DECONGESTANT 2 SPRAY: 0.05 SPRAY NASAL at 02:06

## 2023-06-02 RX ADMIN — ONDANSETRON 4 MG: 2 INJECTION, SOLUTION INTRAMUSCULAR; INTRAVENOUS at 03:06

## 2023-06-02 RX ADMIN — PROPOFOL 25 MG: 10 INJECTION, EMULSION INTRAVENOUS at 03:06

## 2023-06-02 RX ADMIN — LIDOCAINE HYDROCHLORIDE 100 MG: 20 INJECTION INTRAVENOUS at 03:06

## 2023-06-02 NOTE — TRANSFER OF CARE
"Anesthesia Transfer of Care Note    Patient: Micha Bolton    Procedure(s) Performed: Procedure(s) (LRB):  SLEEP ENDOSCOPY,DRUG-INDUCED (Bilateral)    Patient location: PACU    Anesthesia Type: MAC    Transport from OR: Transported from OR on room air with adequate spontaneous ventilation    Post pain: adequate analgesia    Post assessment: no apparent anesthetic complications    Post vital signs: stable    Level of consciousness: awake    Nausea/Vomiting: no nausea/vomiting    Complications: none    Transfer of care protocol was followed      Last vitals:   Visit Vitals  BP (!) 133/90 (BP Location: Right arm, Patient Position: Lying)   Pulse 72   Temp 36.8 °C (98.2 °F)   Resp 16   Ht 5' 11" (1.803 m)   Wt 105.7 kg (233 lb)   SpO2 97%   BMI 32.50 kg/m²     "

## 2023-06-02 NOTE — ANESTHESIA PREPROCEDURE EVALUATION
06/02/2023  Micha Bolton is a 52 y.o., male.      Pre-op Assessment    I have reviewed the NPO Status.   I have reviewed the Medications.     Review of Systems  Anesthesia Hx:  No problems with previous Anesthesia    Cardiovascular:   Exercise tolerance: good Hypertension    Pulmonary:   Sleep Apnea    Education provided regarding risk of obstructive sleep apnea     Hepatic/GI:  Hepatic/GI Normal    Musculoskeletal:   Arthritis     Endocrine:  Obesity / BMI > 30      Physical Exam  General: Well nourished    Airway:  Mallampati: II   Mouth Opening: Normal  TM Distance: Normal  Tongue: Normal  Neck ROM: Normal ROM    Dental:  Intact    Chest/Lungs:  Clear to auscultation, Normal Respiratory Rate        Anesthesia Plan  Type of Anesthesia, risks & benefits discussed:    Anesthesia Type: Gen Natural Airway  Intra-op Monitoring Plan: Standard ASA Monitors  Induction:  IV  Informed Consent: Informed consent signed with the Patient and all parties understand the risks and agree with anesthesia plan.  All questions answered. Patient consented to blood products? No  ASA Score: 2    Ready For Surgery From Anesthesia Perspective.     .

## 2023-06-02 NOTE — OP NOTE
06/02/2023    PREOPERATIVE DIAGNOSES:   1. Obstructive sleep apnea  2. Positive pressure intolerance and persistent sleep apnea after CPAP    POSTOPERATIVE DIAGNOSES:   1. Obstructive sleep apnea  2. Positive pressure intolerance and persistent sleep apnea after CPAP    PROCEDURES:   1. Drug-induced sleep endoscopy (flexible fiberoptic laryngoscopy with examination under anesthesia).     SURGEON: Roman Macias MD    ASSISTANT: None    ANESTHESIA: IV sedation.     ESTIMATED BLOOD LOSS: None.     COMPLICATIONS: None.     BRIEF CLINICAL HISTORY: This is a 52 y.o. -year-old male with a history of   severe symptomatic obstructive sleep apnea, who is intolerant   and unable to achieve benefit with positive pressure therapy. he   presents today for drug-induced sleep endoscopy to better characterize the locations and pattern of obstruction and to predict appropriate medical and/or surgical options moving forward.     DESCRIPTION OF PROCEDURE:  The patient was seen in the pre-operative holding area. Informaed consent was signed. Oxymetazoline was sprayed into the nasal cavities, followed by 2% viscous Lidocaine on pledgets placed into the nasal cavities for anesthesia.     The patient was brought to the operating room and was anesthetized via the standard drug-induced sleep endoscopy  protocol. The propofol infusion rate was started at 100 mcg and gradually increased to a level of 225 mcg, at which point, conditions that mimic sleep were gradually observed.     The patient was non-responsive to verbal commands, but still with spontaneous respiration. Sleep disordered breathing and associated desaturation events were clearly observed.     Under these conditions, the flexible endoscope was inserted to examine both sides of the nose as well as the pharynx and larynx.     The nose was relatively unremarkable. The retropalatal space showed a intermediate oriented palate.     The VOTE score at baseline was:  Velopharynx:2,  Majority AP  Oropharynx: 1, lateral wall collapse  Tongue Base: 2, AP (significant lingual tonsil hypertrophy)  Epiglottis: 2, AP (by way of base of tongue)    In summary, there was no evidence of complete concentric palatal obstruction   and she/he does appear to be a candidate anatomically for hypoglossal nerve   stimulation therapy.     I performed the entire procedure.

## 2023-06-02 NOTE — PATIENT INSTRUCTIONS
Post-op Sleep Endoscopy  Roman Macias MD  Otolaryngology - [facility] - [phone]  Cell Phone (after hours) - [phone]      Pain and Activity  You will have minimal nasal and throat pain. This can be related to the pressure from the scope and will resolve after a few days.  You can resume your normal activities as tolerated on the day following surgery.    Diet  Make sure to get enough fluids and nutrients. Food and drink guidelines include  No diet restrictions.       Medication  Give only medications approved by your doctor. Follow directions closely when giving your child medications.  Resume your normal medications. OK to take Tylenol or Ibuprofen for pain as needed.       Dr. Macias will call to go over the results and options.

## 2023-06-02 NOTE — BRIEF OP NOTE
[facility] - Surgery  Brief Operative Note     SUMMARY     Surgery Date: 6/2/2023     Surgeon(s) and Role:     * Roman Macias MD - Primary    Assisting Surgeon: None    Pre-op Diagnosis:  HARSHA (obstructive sleep apnea) [G47.33]  Intolerance of continuous positive airway pressure (CPAP) ventilation [Z78.9]  BMI 32.0-32.9,adult [Z68.32]    Post-op Diagnosis:  Post-Op Diagnosis Codes:     * HARSHA (obstructive sleep apnea) [G47.33]     * Intolerance of continuous positive airway pressure (CPAP) ventilation [Z78.9]     * BMI 32.0-32.9,adult [Z68.32]    Procedure(s) (LRB):  SLEEP ENDOSCOPY,DRUG-INDUCED (Bilateral)    Anesthesia: General    Description of the findings of the procedure: DISE    Findings/Key Components: DISE    Estimated Blood Loss: * No values recorded between 6/2/2023 12:00 AM and 6/2/2023  3:56 PM *         Specimens:   Specimen (24h ago, onward)      None            Discharge Note    SUMMARY     Admit Date: 6/2/2023    Discharge Date and Time:  06/02/2023 3:56 PM    Hospital Course (synopsis of major diagnoses, care, treatment, and services provided during the course of the hospital stay): Did well following surgery and was discharged uneventfully     Final Diagnosis: Post-Op Diagnosis Codes:     * HARSHA (obstructive sleep apnea) [G47.33]     * Intolerance of continuous positive airway pressure (CPAP) ventilation [Z78.9]     * BMI 32.0-32.9,adult [Z68.32]    Disposition: Home or Self Care    Follow Up/Patient Instructions: Regular diet. Activity as tolerated    Medications:  Reconciled Home Medications:   Current Discharge Medication List        CONTINUE these medications which have NOT CHANGED    Details   amLODIPine (NORVASC) 10 MG tablet TAKE 1 TABLET BY MOUTH 1 TIME A DAY    Comments: .      lisinopriL (PRINIVIL,ZESTRIL) 40 MG tablet TAKE 1 TABLET (40 MG) BY MOUTH 1 TIME A DAY    Comments: .      nebivoloL (BYSTOLIC) 10 MG Tab Take 10 mg by mouth.      ibuprofen (ADVIL,MOTRIN) 600 MG tablet Take 1  tablet (600 mg total) by mouth 3 (three) times daily.  Qty: 45 tablet, Refills: 1    Associated Diagnoses: Cyst of bone of right shoulder      sildenafiL (VIAGRA) 100 MG tablet Take 100 mg by mouth once daily.           No discharge procedures on file.

## 2023-06-05 NOTE — ANESTHESIA POSTPROCEDURE EVALUATION
Anesthesia Post Evaluation    Patient: Micha Bolton    Procedure(s) Performed: Procedure(s) (LRB):  SLEEP ENDOSCOPY,DRUG-INDUCED (Bilateral)    Final Anesthesia Type: general      Patient location during evaluation: PACU  Patient participation: Yes- Able to Participate  Level of consciousness: awake and alert and oriented  Post-procedure vital signs: reviewed and stable  Pain management: adequate  Airway patency: patent    PONV status at discharge: No PONV  Anesthetic complications: no      Cardiovascular status: blood pressure returned to baseline  Respiratory status: unassisted, spontaneous ventilation and room air  Hydration status: euvolemic  Follow-up not needed.          Vitals Value Taken Time   /85 06/02/23 1610   Temp 36.8 °C (98.2 °F) 06/02/23 1600   Pulse 72 06/02/23 1610   Resp 16 06/02/23 1610   SpO2 97 % 06/02/23 1610         Event Time   Out of Recovery 16:05:40         Pain/Robert Score: No data recorded

## 2023-07-10 ENCOUNTER — TELEPHONE (OUTPATIENT)
Dept: OTOLARYNGOLOGY | Facility: CLINIC | Age: 53
End: 2023-07-10
Payer: COMMERCIAL

## 2023-07-10 DIAGNOSIS — Z78.9 INTOLERANCE OF CONTINUOUS POSITIVE AIRWAY PRESSURE (CPAP) VENTILATION: ICD-10-CM

## 2023-07-10 DIAGNOSIS — G47.33 OSA (OBSTRUCTIVE SLEEP APNEA): Primary | ICD-10-CM

## 2023-07-10 NOTE — TELEPHONE ENCOUNTER
S/w pt and he thinks that his HARSHA is getting worse d/t awakening at night because he's not breathing. Pt states that he was told to lose about 5 lbs for insurance to approve the Inspire. Pt states that ppl are telling him that he looks like he has lost weight. Advised pt to come to the clinic so we can weigh him and put in his records. Pt states he will come by one day this week to weigh and go from there.

## 2023-07-10 NOTE — TELEPHONE ENCOUNTER
----- Message from Ayanna Nascimento sent at 7/10/2023 12:44 PM CDT -----  Contact: Pt  Type: Needs Medical Advice    Who Called:Pt  Best Call Back Number:[phone]    Additional Information Requesting a call back regarding Pt was calling to speak with office in regards to some issues they are having and wanted to speak with the nurse pt stated to please call when available Thank you  Please Advise-Thank you

## 2023-08-09 ENCOUNTER — TELEPHONE (OUTPATIENT)
Dept: OTOLARYNGOLOGY | Facility: CLINIC | Age: 53
End: 2023-08-09
Payer: COMMERCIAL

## 2023-08-29 LAB — CRC RECOMMENDATION EXT: NORMAL

## 2023-08-30 ENCOUNTER — CLINICAL SUPPORT (OUTPATIENT)
Dept: OTOLARYNGOLOGY | Facility: CLINIC | Age: 53
End: 2023-08-30
Payer: COMMERCIAL

## 2023-08-30 ENCOUNTER — TELEPHONE (OUTPATIENT)
Dept: OTOLARYNGOLOGY | Facility: CLINIC | Age: 53
End: 2023-08-30

## 2023-08-30 VITALS — WEIGHT: 226.88 LBS | HEIGHT: 71 IN | BODY MASS INDEX: 31.76 KG/M2

## 2023-08-30 DIAGNOSIS — G47.33 OSA (OBSTRUCTIVE SLEEP APNEA): Primary | ICD-10-CM

## 2023-08-30 DIAGNOSIS — Z78.9 INTOLERANCE OF CONTINUOUS POSITIVE AIRWAY PRESSURE (CPAP) VENTILATION: ICD-10-CM

## 2023-08-30 PROCEDURE — 99999 PR PBB SHADOW E&M-EST. PATIENT-LVL III: ICD-10-PCS | Mod: PBBFAC,,,

## 2023-08-30 PROCEDURE — 99999 PR PBB SHADOW E&M-EST. PATIENT-LVL III: CPT | Mod: PBBFAC,,,

## 2023-08-30 NOTE — PROGRESS NOTES
Pt came in to clinic today for a weight check prior to scheduling for Inspire. Pt's weight today is 102.9 kg.

## 2023-08-30 NOTE — TELEPHONE ENCOUNTER
Pt cam in to the clinic today for a weight check. Pt's current weight is 102.9kg (226lbs 13.7oz), his new weight has been updated in his chart.

## 2023-10-04 ENCOUNTER — TELEPHONE (OUTPATIENT)
Dept: OTOLARYNGOLOGY | Facility: CLINIC | Age: 53
End: 2023-10-04
Payer: COMMERCIAL

## 2023-10-04 NOTE — TELEPHONE ENCOUNTER
S/w pt and advised that [facility] pre-op nurse will call the day prior to surgery with instructions and arrival time, he verbalized understanding.

## 2023-10-04 NOTE — TELEPHONE ENCOUNTER
----- Message from Nory Torres, Patient Care Assistant sent at 10/4/2023  9:40 AM CDT -----  Contact: self  Pt is calling to speak someone regarding his procedure [phone]  thanks

## 2023-10-18 ENCOUNTER — OFFICE VISIT (OUTPATIENT)
Dept: OTOLARYNGOLOGY | Facility: CLINIC | Age: 53
End: 2023-10-18
Payer: COMMERCIAL

## 2023-10-18 DIAGNOSIS — G47.33 OSA (OBSTRUCTIVE SLEEP APNEA): Primary | ICD-10-CM

## 2023-10-18 DIAGNOSIS — Z78.9 INTOLERANCE OF CONTINUOUS POSITIVE AIRWAY PRESSURE (CPAP) VENTILATION: ICD-10-CM

## 2023-10-18 PROCEDURE — 99024 PR POST-OP FOLLOW-UP VISIT: ICD-10-PCS | Mod: S$GLB,,, | Performed by: OTOLARYNGOLOGY

## 2023-10-18 PROCEDURE — 1160F PR REVIEW ALL MEDS BY PRESCRIBER/CLIN PHARMACIST DOCUMENTED: ICD-10-PCS | Mod: CPTII,S$GLB,, | Performed by: OTOLARYNGOLOGY

## 2023-10-18 PROCEDURE — 99999 PR PBB SHADOW E&M-EST. PATIENT-LVL II: ICD-10-PCS | Mod: PBBFAC,,, | Performed by: OTOLARYNGOLOGY

## 2023-10-18 PROCEDURE — 1159F MED LIST DOCD IN RCRD: CPT | Mod: CPTII,S$GLB,, | Performed by: OTOLARYNGOLOGY

## 2023-10-18 PROCEDURE — 99999 PR PBB SHADOW E&M-EST. PATIENT-LVL II: CPT | Mod: PBBFAC,,, | Performed by: OTOLARYNGOLOGY

## 2023-10-18 PROCEDURE — 1159F PR MEDICATION LIST DOCUMENTED IN MEDICAL RECORD: ICD-10-PCS | Mod: CPTII,S$GLB,, | Performed by: OTOLARYNGOLOGY

## 2023-10-18 PROCEDURE — 99024 POSTOP FOLLOW-UP VISIT: CPT | Mod: S$GLB,,, | Performed by: OTOLARYNGOLOGY

## 2023-10-18 PROCEDURE — 1160F RVW MEDS BY RX/DR IN RCRD: CPT | Mod: CPTII,S$GLB,, | Performed by: OTOLARYNGOLOGY

## 2023-10-18 NOTE — PROGRESS NOTES
Micha is here for a post-operative visit following   Hypoglossal nerve stimulator    No issues, doing well  Did lift something POD 1 that he shouldn't have - worried about swelling    Exam:  Alert, active, appropriate  Incisions c/d/I  Expected amount of swelling; no hematoma or seroma  Tongue midline with protrusion and strong  No facial weakness    Healing well  RTC next month for activation

## 2023-11-15 ENCOUNTER — OFFICE VISIT (OUTPATIENT)
Dept: OTOLARYNGOLOGY | Facility: CLINIC | Age: 53
End: 2023-11-15
Payer: COMMERCIAL

## 2023-11-15 ENCOUNTER — TELEPHONE (OUTPATIENT)
Dept: OTOLARYNGOLOGY | Facility: CLINIC | Age: 53
End: 2023-11-15

## 2023-11-15 VITALS — HEIGHT: 71 IN | BODY MASS INDEX: 32.19 KG/M2 | WEIGHT: 229.94 LBS

## 2023-11-15 DIAGNOSIS — Z98.890 POST-OPERATIVE STATE: Primary | ICD-10-CM

## 2023-11-15 PROCEDURE — 99999 PR PBB SHADOW E&M-EST. PATIENT-LVL III: CPT | Mod: PBBFAC,,, | Performed by: OTOLARYNGOLOGY

## 2023-11-15 PROCEDURE — 99024 POSTOP FOLLOW-UP VISIT: CPT | Mod: S$GLB,,, | Performed by: OTOLARYNGOLOGY

## 2023-11-15 PROCEDURE — 1160F PR REVIEW ALL MEDS BY PRESCRIBER/CLIN PHARMACIST DOCUMENTED: ICD-10-PCS | Mod: CPTII,S$GLB,, | Performed by: OTOLARYNGOLOGY

## 2023-11-15 PROCEDURE — 99024 PR POST-OP FOLLOW-UP VISIT: ICD-10-PCS | Mod: S$GLB,,, | Performed by: OTOLARYNGOLOGY

## 2023-11-15 PROCEDURE — 1160F RVW MEDS BY RX/DR IN RCRD: CPT | Mod: CPTII,S$GLB,, | Performed by: OTOLARYNGOLOGY

## 2023-11-15 PROCEDURE — 1159F PR MEDICATION LIST DOCUMENTED IN MEDICAL RECORD: ICD-10-PCS | Mod: CPTII,S$GLB,, | Performed by: OTOLARYNGOLOGY

## 2023-11-15 PROCEDURE — 99999 PR PBB SHADOW E&M-EST. PATIENT-LVL III: ICD-10-PCS | Mod: PBBFAC,,, | Performed by: OTOLARYNGOLOGY

## 2023-11-15 PROCEDURE — 1159F MED LIST DOCD IN RCRD: CPT | Mod: CPTII,S$GLB,, | Performed by: OTOLARYNGOLOGY

## 2023-11-15 RX ORDER — NEBIVOLOL 10 MG/1
10 TABLET ORAL
COMMUNITY
Start: 2023-10-11

## 2023-11-15 NOTE — TELEPHONE ENCOUNTER
----- Message from Steven Roberson LPN sent at 11/15/2023  1:50 PM CST -----  12/1/23-2 wk activation call

## 2023-11-15 NOTE — PROGRESS NOTES
Micha is here for a post-operative visit following   Hypoglossal nerve stimulator    No issues, doing well    Exam:  Alert, active, appropriate  Incisions c/d/I  Expected amount of swelling; no hematoma or seroma  Tongue midline with protrusion and strong  No facial weakness    Sensation 0.7  Functional 0.7  Range 0.7 - 1.7    Healing well  Post-op phone call in 2 weeks and fu with Feliz in 6-8 wks

## 2023-12-06 ENCOUNTER — TELEPHONE (OUTPATIENT)
Dept: OTOLARYNGOLOGY | Facility: CLINIC | Age: 53
End: 2023-12-06
Payer: COMMERCIAL

## 2023-12-06 NOTE — TELEPHONE ENCOUNTER
Reviewed post-op questions w/pt, his answers are as follows:    Yes, he is using every night  On step 3, moving up to 4 tonight  Yes, pt feels much more rested during the day. He's even been told by people that he looks more rested.    F/u appt w/Dr. Piper on 1/9/24, pt confirmed.

## 2024-02-08 LAB — CRC RECOMMENDATION EXT: NORMAL

## 2024-03-18 ENCOUNTER — TELEPHONE (OUTPATIENT)
Dept: OTOLARYNGOLOGY | Facility: CLINIC | Age: 54
End: 2024-03-18
Payer: COMMERCIAL

## 2024-03-18 NOTE — TELEPHONE ENCOUNTER
----- Message from Tatiana Larson sent at 3/18/2024 10:50 AM CDT -----  Contact: pt  Type:  Needs Medical Advice    Who Called: pt    Symptoms (please be specific): issues with his inspire     How long has patient had these symptoms:  2 weeks    Would the patient rather a call back or a response via [facility]? Call back    Best Call Back Number: [phone]    Additional Information: did speak with inspire people and tried a few things but they didn't work.    Wants to know if you can help    Please call to advise  Thanks

## 2024-03-18 NOTE — TELEPHONE ENCOUNTER
I spoke with patient he has been doing good since activation on 11-15-23 until 2 weeks ago. He has stop feeling the initial turn on feeling.  He spoke with Vik and they trouble shoot with him and still have issues does not feel it is working.  He also feels he may have in infection at the site at his neck / teeth area.  Appointment made for tomorrow with you and for Yanira to come trouble shoot with pt.

## 2024-03-19 ENCOUNTER — OFFICE VISIT (OUTPATIENT)
Dept: OTOLARYNGOLOGY | Facility: CLINIC | Age: 54
End: 2024-03-19
Payer: COMMERCIAL

## 2024-03-19 VITALS — WEIGHT: 229.94 LBS | BODY MASS INDEX: 32.19 KG/M2 | HEIGHT: 71 IN

## 2024-03-19 DIAGNOSIS — G47.33 OSA (OBSTRUCTIVE SLEEP APNEA): Primary | ICD-10-CM

## 2024-03-19 PROCEDURE — 99213 OFFICE O/P EST LOW 20 MIN: CPT | Mod: S$GLB,,, | Performed by: OTOLARYNGOLOGY

## 2024-03-19 PROCEDURE — 3008F BODY MASS INDEX DOCD: CPT | Mod: CPTII,S$GLB,, | Performed by: OTOLARYNGOLOGY

## 2024-03-19 PROCEDURE — 99999 PR PBB SHADOW E&M-EST. PATIENT-LVL III: CPT | Mod: PBBFAC,,, | Performed by: OTOLARYNGOLOGY

## 2024-03-19 PROCEDURE — 1160F RVW MEDS BY RX/DR IN RCRD: CPT | Mod: CPTII,S$GLB,, | Performed by: OTOLARYNGOLOGY

## 2024-03-19 PROCEDURE — 1159F MED LIST DOCD IN RCRD: CPT | Mod: CPTII,S$GLB,, | Performed by: OTOLARYNGOLOGY

## 2024-03-19 RX ORDER — METOPROLOL TARTRATE 50 MG/1
50 TABLET ORAL
COMMUNITY
Start: 2024-01-30

## 2024-03-21 NOTE — PROGRESS NOTES
Subjective:       Patient ID: Micha Bolton is a 53 y.o. male.    Chief Complaint: Follow-up (Tenderness from impant - inspire issues)    Micha is here for follow-up.   Here today for HARSHA and fu of Inspire.  Was doing well for a while, but then recently felt like it stopped working.   Had concerns about poss infection bc of pain beneath jaw on the right    Patient validated questionnaires (if applicable):      %            No data to display                   No data to display                   No data to display                     Review of Systems   Constitutional: Negative for activity change and appetite change.   Respiratory: Negative for difficulty breathing and wheezing   Cardiovascular: Negative for chest pain.      Objective:        Constitutional:   He appears well-developed and well-nourished.     Head:  Normocephalic and atraumatic.     Mouth/Throat  Lips, teeth, and gums normal. Abnormal dentition (possible carious premolar).   Good tongue movement.  Neck soft, no swelling        Pulmonary/Chest:   Effort normal.     Psychiatric:   He has a normal mood and affect.         Tests / Results:  Device interrogated    Assessment:       1. HARSHA (obstructive sleep apnea)          Plan:         Device is working well. Bumped up to 1.5. May need to go up a few more levels, but not necessary if feeling a lot of benefit.  No concern for infection. Discussed seeing dentist as possible tooth issue

## 2024-07-31 ENCOUNTER — OFFICE VISIT (OUTPATIENT)
Dept: FAMILY MEDICINE | Facility: CLINIC | Age: 54
End: 2024-07-31
Payer: COMMERCIAL

## 2024-07-31 ENCOUNTER — LAB VISIT (OUTPATIENT)
Dept: LAB | Facility: HOSPITAL | Age: 54
End: 2024-07-31
Attending: PHYSICIAN ASSISTANT
Payer: COMMERCIAL

## 2024-07-31 VITALS
HEART RATE: 74 BPM | BODY MASS INDEX: 33.32 KG/M2 | WEIGHT: 238 LBS | RESPIRATION RATE: 18 BRPM | OXYGEN SATURATION: 98 % | DIASTOLIC BLOOD PRESSURE: 88 MMHG | HEIGHT: 71 IN | SYSTOLIC BLOOD PRESSURE: 139 MMHG

## 2024-07-31 DIAGNOSIS — Z13.1 ENCOUNTER FOR SCREENING FOR DIABETES MELLITUS: ICD-10-CM

## 2024-07-31 DIAGNOSIS — J30.9 ALLERGIC RHINITIS, UNSPECIFIED SEASONALITY, UNSPECIFIED TRIGGER: ICD-10-CM

## 2024-07-31 DIAGNOSIS — Z85.038 HISTORY OF COLON CANCER: ICD-10-CM

## 2024-07-31 DIAGNOSIS — E34.9 HYPOTESTOSTERONEMIA: ICD-10-CM

## 2024-07-31 DIAGNOSIS — Z12.5 SCREENING PSA (PROSTATE SPECIFIC ANTIGEN): ICD-10-CM

## 2024-07-31 DIAGNOSIS — Z13.220 ENCOUNTER FOR LIPID SCREENING FOR CARDIOVASCULAR DISEASE: ICD-10-CM

## 2024-07-31 DIAGNOSIS — E66.9 CLASS 1 OBESITY WITHOUT SERIOUS COMORBIDITY WITH BODY MASS INDEX (BMI) OF 33.0 TO 33.9 IN ADULT, UNSPECIFIED OBESITY TYPE: ICD-10-CM

## 2024-07-31 DIAGNOSIS — R21 RASH AND NONSPECIFIC SKIN ERUPTION: ICD-10-CM

## 2024-07-31 DIAGNOSIS — N52.9 ERECTILE DYSFUNCTION, UNSPECIFIED ERECTILE DYSFUNCTION TYPE: ICD-10-CM

## 2024-07-31 DIAGNOSIS — Z13.6 ENCOUNTER FOR LIPID SCREENING FOR CARDIOVASCULAR DISEASE: ICD-10-CM

## 2024-07-31 DIAGNOSIS — I10 PRIMARY HYPERTENSION: ICD-10-CM

## 2024-07-31 DIAGNOSIS — Z00.00 ENCOUNTER FOR ANNUAL HEALTH EXAMINATION: Primary | ICD-10-CM

## 2024-07-31 DIAGNOSIS — Z00.00 ENCOUNTER FOR ANNUAL HEALTH EXAMINATION: ICD-10-CM

## 2024-07-31 PROBLEM — E66.811 CLASS 1 OBESITY WITHOUT SERIOUS COMORBIDITY WITH BODY MASS INDEX (BMI) OF 33.0 TO 33.9 IN ADULT: Status: ACTIVE | Noted: 2024-07-31

## 2024-07-31 PROBLEM — Z86.0100 HISTORY OF COLON POLYPS: Status: ACTIVE | Noted: 2024-07-31

## 2024-07-31 PROBLEM — C18.7 MALIGNANT NEOPLASM OF SIGMOID COLON: Status: ACTIVE | Noted: 2024-07-31

## 2024-07-31 PROBLEM — Z86.010 HISTORY OF COLON POLYPS: Status: ACTIVE | Noted: 2024-07-31

## 2024-07-31 LAB
ALBUMIN SERPL BCP-MCNC: 3.6 G/DL (ref 3.5–5.2)
ALP SERPL-CCNC: 65 U/L (ref 55–135)
ALT SERPL W/O P-5'-P-CCNC: 20 U/L (ref 10–44)
ANION GAP SERPL CALC-SCNC: 10 MMOL/L (ref 8–16)
AST SERPL-CCNC: 17 U/L (ref 10–40)
BASOPHILS # BLD AUTO: 0.1 K/UL (ref 0–0.2)
BASOPHILS NFR BLD: 1.4 % (ref 0–1.9)
BILIRUB SERPL-MCNC: 0.6 MG/DL (ref 0.1–1)
BUN SERPL-MCNC: 14 MG/DL (ref 6–20)
CALCIUM SERPL-MCNC: 9.3 MG/DL (ref 8.7–10.5)
CHLORIDE SERPL-SCNC: 102 MMOL/L (ref 95–110)
CHOLEST SERPL-MCNC: 185 MG/DL (ref 120–199)
CHOLEST/HDLC SERPL: 6.6 {RATIO} (ref 2–5)
CO2 SERPL-SCNC: 28 MMOL/L (ref 23–29)
COMPLEXED PSA SERPL-MCNC: 0.54 NG/ML (ref 0–4)
CREAT SERPL-MCNC: 0.9 MG/DL (ref 0.5–1.4)
DIFFERENTIAL METHOD BLD: NORMAL
EOSINOPHIL # BLD AUTO: 0.2 K/UL (ref 0–0.5)
EOSINOPHIL NFR BLD: 2.7 % (ref 0–8)
ERYTHROCYTE [DISTWIDTH] IN BLOOD BY AUTOMATED COUNT: 12.6 % (ref 11.5–14.5)
EST. GFR  (NO RACE VARIABLE): >60 ML/MIN/1.73 M^2
ESTIMATED AVG GLUCOSE: 128 MG/DL (ref 68–131)
GLUCOSE SERPL-MCNC: 129 MG/DL (ref 70–110)
HBA1C MFR BLD: 6.1 % (ref 4–5.6)
HCT VFR BLD AUTO: 44 % (ref 40–54)
HDLC SERPL-MCNC: 28 MG/DL (ref 40–75)
HDLC SERPL: 15.1 % (ref 20–50)
HGB BLD-MCNC: 14.8 G/DL (ref 14–18)
IMM GRANULOCYTES # BLD AUTO: 0.02 K/UL (ref 0–0.04)
IMM GRANULOCYTES NFR BLD AUTO: 0.3 % (ref 0–0.5)
LDLC SERPL CALC-MCNC: 138.6 MG/DL (ref 63–159)
LYMPHOCYTES # BLD AUTO: 2.5 K/UL (ref 1–4.8)
LYMPHOCYTES NFR BLD: 33.4 % (ref 18–48)
MCH RBC QN AUTO: 29.9 PG (ref 27–31)
MCHC RBC AUTO-ENTMCNC: 33.6 G/DL (ref 32–36)
MCV RBC AUTO: 89 FL (ref 82–98)
MONOCYTES # BLD AUTO: 0.9 K/UL (ref 0.3–1)
MONOCYTES NFR BLD: 11.9 % (ref 4–15)
NEUTROPHILS # BLD AUTO: 3.7 K/UL (ref 1.8–7.7)
NEUTROPHILS NFR BLD: 50.3 % (ref 38–73)
NONHDLC SERPL-MCNC: 157 MG/DL
NRBC BLD-RTO: 0 /100 WBC
PLATELET # BLD AUTO: 405 K/UL (ref 150–450)
PMV BLD AUTO: 10.2 FL (ref 9.2–12.9)
POTASSIUM SERPL-SCNC: 3.2 MMOL/L (ref 3.5–5.1)
PROT SERPL-MCNC: 7.3 G/DL (ref 6–8.4)
RBC # BLD AUTO: 4.95 M/UL (ref 4.6–6.2)
SODIUM SERPL-SCNC: 140 MMOL/L (ref 136–145)
TESTOST SERPL-MCNC: 332 NG/DL (ref 304–1227)
TRIGL SERPL-MCNC: 92 MG/DL (ref 30–150)
TSH SERPL DL<=0.005 MIU/L-ACNC: 0.74 UIU/ML (ref 0.4–4)
WBC # BLD AUTO: 7.33 K/UL (ref 3.9–12.7)

## 2024-07-31 PROCEDURE — 84153 ASSAY OF PSA TOTAL: CPT | Performed by: PHYSICIAN ASSISTANT

## 2024-07-31 PROCEDURE — 83036 HEMOGLOBIN GLYCOSYLATED A1C: CPT | Performed by: PHYSICIAN ASSISTANT

## 2024-07-31 PROCEDURE — 84403 ASSAY OF TOTAL TESTOSTERONE: CPT | Performed by: PHYSICIAN ASSISTANT

## 2024-07-31 PROCEDURE — 80061 LIPID PANEL: CPT | Performed by: PHYSICIAN ASSISTANT

## 2024-07-31 PROCEDURE — 85025 COMPLETE CBC W/AUTO DIFF WBC: CPT | Performed by: PHYSICIAN ASSISTANT

## 2024-07-31 PROCEDURE — 36415 COLL VENOUS BLD VENIPUNCTURE: CPT | Mod: PO | Performed by: PHYSICIAN ASSISTANT

## 2024-07-31 PROCEDURE — 1159F MED LIST DOCD IN RCRD: CPT | Mod: CPTII,S$GLB,, | Performed by: PHYSICIAN ASSISTANT

## 2024-07-31 PROCEDURE — 84443 ASSAY THYROID STIM HORMONE: CPT | Performed by: PHYSICIAN ASSISTANT

## 2024-07-31 PROCEDURE — 3079F DIAST BP 80-89 MM HG: CPT | Mod: CPTII,S$GLB,, | Performed by: PHYSICIAN ASSISTANT

## 2024-07-31 PROCEDURE — 1160F RVW MEDS BY RX/DR IN RCRD: CPT | Mod: CPTII,S$GLB,, | Performed by: PHYSICIAN ASSISTANT

## 2024-07-31 PROCEDURE — 99204 OFFICE O/P NEW MOD 45 MIN: CPT | Mod: S$GLB,,, | Performed by: PHYSICIAN ASSISTANT

## 2024-07-31 PROCEDURE — 99999 PR PBB SHADOW E&M-EST. PATIENT-LVL V: CPT | Mod: PBBFAC,,, | Performed by: PHYSICIAN ASSISTANT

## 2024-07-31 PROCEDURE — 80053 COMPREHEN METABOLIC PANEL: CPT | Performed by: PHYSICIAN ASSISTANT

## 2024-07-31 PROCEDURE — 3075F SYST BP GE 130 - 139MM HG: CPT | Mod: CPTII,S$GLB,, | Performed by: PHYSICIAN ASSISTANT

## 2024-07-31 PROCEDURE — 3008F BODY MASS INDEX DOCD: CPT | Mod: CPTII,S$GLB,, | Performed by: PHYSICIAN ASSISTANT

## 2024-07-31 RX ORDER — AMLODIPINE BESYLATE 10 MG/1
10 TABLET ORAL DAILY
Qty: 90 TABLET | Refills: 3 | Status: SHIPPED | OUTPATIENT
Start: 2024-07-31

## 2024-07-31 RX ORDER — TIRZEPATIDE 2.5 MG/.5ML
2.5 INJECTION, SOLUTION SUBCUTANEOUS
Qty: 2 ML | Refills: 5 | Status: SHIPPED | OUTPATIENT
Start: 2024-07-31 | End: 2025-01-15

## 2024-07-31 RX ORDER — FLUTICASONE PROPIONATE 50 MCG
SPRAY, SUSPENSION (ML) NASAL
Qty: 16 G | Refills: 11 | Status: SHIPPED | OUTPATIENT
Start: 2024-07-31

## 2024-07-31 RX ORDER — NEBIVOLOL 10 MG/1
10 TABLET ORAL DAILY
Qty: 90 TABLET | Refills: 3 | Status: SHIPPED | OUTPATIENT
Start: 2024-07-31

## 2024-07-31 RX ORDER — TRIAMCINOLONE ACETONIDE 1 MG/G
CREAM TOPICAL 2 TIMES DAILY
Qty: 80 G | Refills: 0 | Status: SHIPPED | OUTPATIENT
Start: 2024-07-31 | End: 2025-07-31

## 2024-07-31 RX ORDER — SILDENAFIL 100 MG/1
100 TABLET, FILM COATED ORAL DAILY
Qty: 30 TABLET | Refills: 11 | Status: SHIPPED | OUTPATIENT
Start: 2024-07-31

## 2024-07-31 NOTE — PROGRESS NOTES
Assessment/Plan:    Problem List Items Addressed This Visit          Cardiac/Vascular    Primary hypertension    Overview     Hypertension Medications               amLODIPine (NORVASC) 10 MG tablet TAKE 1 TABLET BY MOUTH 1 TIME A DAY    nebivoloL (BYSTOLIC) 10 MG Tab Take 10 mg by mouth.          -at goal today  -continue lifestyle modification with low sodium diet and exercise   -discussed hypertension disease course and importance of treating high blood pressure  -patient understood and advised of risk of untreated blood pressure. ER precautions were given for symptoms of hypertensive urgency and emergency.           Relevant Medications    amLODIPine (NORVASC) 10 MG tablet    nebivoloL (BYSTOLIC) 10 MG Tab    Other Relevant Orders    CBC Auto Differential    Comprehensive Metabolic Panel    TSH       Endocrine    Hypotestosteronemia    Relevant Orders    Testosterone       GI    History of colon polyps    Overview     -followed by GI, Dr. Smallwood  -last c-scope 6 months ago due for repeat in 6 months          Other Visit Diagnoses       Encounter for annual health examination    -  Primary    Relevant Orders    CBC Auto Differential    Comprehensive Metabolic Panel    Allergic rhinitis, unspecified seasonality, unspecified trigger        Relevant Medications    fluticasone propionate (FLONASE) 50 mcg/actuation nasal spray    Class 1 obesity without serious comorbidity with body mass index (BMI) of 33.0 to 33.9 in adult, unspecified obesity type        Relevant Medications    tirzepatide (MOUNJARO) 2.5 mg/0.5 mL PnIj    Screening PSA (prostate specific antigen)        Relevant Orders    PSA, Screening    Encounter for screening for diabetes mellitus        Relevant Orders    Hemoglobin A1C    Encounter for lipid screening for cardiovascular disease        Relevant Orders    Lipid Panel    Erectile dysfunction, unspecified erectile dysfunction type        Relevant Medications    sildenafiL (VIAGRA) 100 MG tablet             No follow-ups on file.    Cony Gee PA-C  _____________________________________________________________________________________________________________________________________________________    CC: ***    HPI:    Patient is in clinic today as an established patient here for ***    Arms, lungs trunk  Just pain  Denies fever or uRI symptoms  Last week were sick with cough  Has not put anything on it     Postnasal drip and chronic cough  Has been ongoing for a long time  Denies asthma or COPD  Denies smoking  Denies wheezing or shortness of breath  Has tried PRN albuterol     Low t  Never followed up with urology    Has had cardiac workup    Had cancerous polpy    Noncompliant with BP medication    Past Medical History:  Past Medical History:   Diagnosis Date    General anesthetics causing adverse effect in therapeutic use     hard to arouse    Hypertension     Primary localized osteoarthrosis of left shoulder     Sleep apnea      Past Surgical History:   Procedure Laterality Date    ARTHROPLASTY OF SHOULDER Left 12/17/2020    Procedure: ARTHROPLASTY, SHOULDER;  Surgeon: Horacio Rodríguez II, MD;  [facility];  Service: Orthopedics;  Laterality: Left;    INSERTION, NEUROSTIMULATOR, HYPOGLOSSAL Right 10/12/2023    Procedure: INSERTION,NEUROSTIMULATOR,HYPOGLOSSAL;  Surgeon: Roman Macias MD;  [facility];  Service: ENT;  Laterality: Right;    LAPAROSCOPIC CHOLECYSTECTOMY N/A 7/21/2021    Procedure: CHOLECYSTECTOMY, LAPAROSCOPIC;  Surgeon: Sally Yan MD;  [facility] OR;  Service: General;  Laterality: N/A;    LAPAROSCOPIC REPAIR OF INGUINAL HERNIA Bilateral 2009    SHOULDER SURGERY Left 02/18/2019    posterior labral repair; extensive debridement of synovium and cuff with SAD    SLEEP ENDOSCOPY, DRUG-INDUCED Bilateral 6/2/2023    Procedure: SLEEP ENDOSCOPY,DRUG-INDUCED;  Surgeon: Roman Macias MD;  Location: [facility];  Service: ENT;  Laterality: Bilateral;     Review of  patient's allergies indicates:   Allergen Reactions    Pcn [penicillins]      As a child, okay to take Keflex per patient     Social History     Tobacco Use    Smoking status: Never    Smokeless tobacco: Never   Substance Use Topics    Alcohol use: Yes     Alcohol/week: 1.0 standard drink of alcohol     Types: 1 Glasses of wine per week    Drug use: No     Family History   Problem Relation Name Age of Onset    Early death Mother  26        malpractice    Heart disease Father      Hypertension Father      Hyperlipidemia Father      Valvular heart disease Father       Current Outpatient Medications on File Prior to Visit   Medication Sig Dispense Refill    chlorhexidine (PERIDEX) 0.12 % solution Use as directed 15 mLs in the mouth or throat 2 (two) times daily.      [DISCONTINUED] amLODIPine (NORVASC) 10 MG tablet TAKE 1 TABLET BY MOUTH 1 TIME A DAY      [DISCONTINUED] nebivoloL (BYSTOLIC) 10 MG Tab Take 10 mg by mouth.      [DISCONTINUED] sildenafiL (VIAGRA) 100 MG tablet Take 100 mg by mouth once daily.      HYDROcodone-acetaminophen (NORCO) 5-325 mg per tablet Take 1 tablet by mouth every 4 (four) hours as needed for Pain. (Patient not taking: Reported on 7/31/2024) 15 tablet 0    ibuprofen (ADVIL,MOTRIN) 600 MG tablet Take 1 tablet (600 mg total) by mouth 3 (three) times daily. (Patient not taking: Reported on 7/31/2024) 45 tablet 1    ondansetron (ZOFRAN-ODT) 4 MG TbDL Take 1 tablet (4 mg total) by mouth every 6 (six) hours as needed (Nausea). (Patient not taking: Reported on 7/31/2024) 10 tablet 0    [DISCONTINUED] metoprolol tartrate (LOPRESSOR) 50 MG tablet Take 50 mg by mouth as needed. (Patient not taking: Reported on 7/31/2024)      [DISCONTINUED] mupirocin (BACTROBAN) 2 % ointment by Nasal route 2 (two) times daily.       Current Facility-Administered Medications on File Prior to Visit   Medication Dose Route Frequency Provider Last Rate Last Admin    HYDROmorphone injection 0.5 mg  0.5 mg Intravenous Q5  "Min PRN Volpi-Abadie, Jacqueline, MD        lactated ringers infusion   Intravenous Continuous Volpi-Abadie, Jacqueline, MD   Stopped at 10/12/23 1304    LIDOcaine (PF) 10 mg/ml (1%) injection 1 mg  0.1 mL Intradermal Once Volpi-Abadie, Jacqueline, MD        LORazepam injection 0.25 mg  0.25 mg Intravenous Q15 Min PRN Volpi-Abadie, Jacqueline, MD        oxyCODONE-acetaminophen 5-325 mg per tablet 1 tablet  1 tablet Oral Q3H PRN Volpi-Abadie, Jacqueline, MD        sodium chloride 0.9% flush 3 mL  3 mL Intravenous PRN Volpi-Abadie, Jacqueline, MD           Review of Systems    Vitals:    07/31/24 1004   BP: (!) 156/97   Pulse: 74   Resp: 18   SpO2: 98%   Weight: 108 kg (238 lb)   Height: 5' 11" (1.803 m)       Wt Readings from Last 3 Encounters:   07/31/24 108 kg (238 lb)   03/19/24 104.3 kg (229 lb 15 oz)   11/15/23 104.3 kg (229 lb 15 oz)       Physical Exam    Health Maintenance   Topic Date Due    Hepatitis C Screening  Never done    Shingles Vaccine (1 of 2) Never done    Lipid Panel  12/02/2025    TETANUS VACCINE  07/15/2031    Colorectal Cancer Screening  02/08/2034     "

## 2024-07-31 NOTE — PROGRESS NOTES
Assessment/Plan:    Problem List Items Addressed This Visit          ENT    Allergic rhinitis    Overview     -chronic postnasal drip  -trial of daily antihistamine and Flonase          Relevant Medications    fluticasone propionate (FLONASE) 50 mcg/actuation nasal spray       Cardiac/Vascular    Primary hypertension    Overview     Hypertension Medications               amLODIPine (NORVASC) 10 MG tablet TAKE 1 TABLET BY MOUTH 1 TIME A DAY    nebivoloL (BYSTOLIC) 10 MG Tab Take 10 mg by mouth.          -at goal today  -admits to noncompliance w/ BP medication  -continue lifestyle modification with low sodium diet and exercise   -discussed hypertension disease course and importance of treating high blood pressure  -patient understood and advised of risk of untreated blood pressure. ER precautions were given for symptoms of hypertensive urgency and emergency.           Relevant Medications    amLODIPine (NORVASC) 10 MG tablet    nebivoloL (BYSTOLIC) 10 MG Tab    Other Relevant Orders    CBC Auto Differential    Comprehensive Metabolic Panel    TSH       Oncology    History of colon cancer    Overview     -mass of sigmoid colon found on c-scope in 1/2024  -mass removed at that time per patient report  -plans for repeat c-scope in 1 year (1/2025)  -followed closely by GI (Dr. Smallwood)  -requesting records            Endocrine    Hypotestosteronemia    Overview     -hx of low testosterone  -has never been on treatment  -previously followed by urology         Relevant Orders    Testosterone    Class 1 obesity without serious comorbidity with body mass index (BMI) of 33.0 to 33.9 in adult    Overview     General weight loss/lifestyle modification strategies discussed (elicit support from others; identify saboteurs; non-food rewards, etc).  Informal exercise measures discussed, e.g. taking stairs instead of elevator.  Regular aerobic exercise program discussed.  Previously tried Semaglutide which was ineffective  Attempting  to start Tirzepatide; discussed risks and benefits  Wt Readings from Last 3 Encounters:   07/31/24 1004 108 kg (238 lb)   03/19/24 1141 104.3 kg (229 lb 15 oz)   11/15/23 1324 104.3 kg (229 lb 15 oz)            Relevant Medications    tirzepatide (MOUNJARO) 2.5 mg/0.5 mL PnIj     Other Visit Diagnoses       Encounter for annual health examination    -  Primary    Relevant Orders    CBC Auto Differential    Comprehensive Metabolic Panel    Screening PSA (prostate specific antigen)        Relevant Orders    PSA, Screening    Encounter for screening for diabetes mellitus        Relevant Orders    Hemoglobin A1C    Encounter for lipid screening for cardiovascular disease        Relevant Orders    Lipid Panel    Erectile dysfunction, unspecified erectile dysfunction type        Relevant Medications    sildenafiL (VIAGRA) 100 MG tablet    Rash and nonspecific skin eruption        Relevant Medications    triamcinolone acetonide 0.1% (KENALOG) 0.1 % cream            Follow up in about 3 months (around 10/31/2024), or if symptoms worsen or fail to improve.    Cony Gee PA-C  ______________________________________________________________________________________________________________________________    CC: establish care    HPI: Patient is a new patient to me here to establish care.     Previous PCP: Dr. Logan Higgins    The patient is currently being treated for essential hypertension. This condition is chronic and stable. The patient is tolerating their medication well with good compliance. Denies any adverse effects of medications. Counseling was offered regarding low sodium diet. The patient has a reduced salt intake. Routine exercise recommended. The patient denies headache, vision changes, chest pain, palpitations, shortness of breath, or lower extremity edema.    Patient found to have a mass on his sigmoid colon in 1/2024 per GI (Dr. Smallwood). He stated that the mass was removed at that time. He continues to  follow up closely with GI and plans for repeat colonoscopy in 1 year.     Patient reports chronic postnasal drip. Denies nasal congestion, rhinorrhea, sinus pressure, sore throat, coughing, wheezing or shortness of breath. Denies hx of allergies. He reports taking PRN antihistamines with minimal relief of symptoms.    Remaining chronic conditions have been reviewed and remain stable. Further detail as stated above.     No other complaints today.     Past Medical History:   Diagnosis Date    General anesthetics causing adverse effect in therapeutic use     hard to arouse    Hypertension     Primary localized osteoarthrosis of left shoulder     Sleep apnea      Past Surgical History:   Procedure Laterality Date    ARTHROPLASTY OF SHOULDER Left 12/17/2020    Procedure: ARTHROPLASTY, SHOULDER;  Surgeon: Horacio Rodríguez II, MD;  Location: [facility] OR;  Service: Orthopedics;  Laterality: Left;    INSERTION, NEUROSTIMULATOR, HYPOGLOSSAL Right 10/12/2023    Procedure: INSERTION,NEUROSTIMULATOR,HYPOGLOSSAL;  Surgeon: Roman Macias MD;  Location: [facility] OR;  Service: ENT;  Laterality: Right;    LAPAROSCOPIC CHOLECYSTECTOMY N/A 7/21/2021    Procedure: CHOLECYSTECTOMY, LAPAROSCOPIC;  Surgeon: Sally Yan MD;  Location: [facility] OR;  Service: General;  Laterality: N/A;    LAPAROSCOPIC REPAIR OF INGUINAL HERNIA Bilateral 2009    SHOULDER SURGERY Left 02/18/2019    posterior labral repair; extensive debridement of synovium and cuff with SAD    SLEEP ENDOSCOPY, DRUG-INDUCED Bilateral 6/2/2023    Procedure: SLEEP ENDOSCOPY,DRUG-INDUCED;  Surgeon: Roman Macias MD;  Location: [facility] OR;  Service: ENT;  Laterality: Bilateral;     Review of patient's allergies indicates:   Allergen Reactions    Pcn [penicillins]      As a child, okay to take Keflex per patient     Social History     Tobacco Use    Smoking status: Never    Smokeless tobacco: Never   Substance Use Topics    Alcohol use: Yes     Alcohol/week: 1.0 standard drink of  alcohol     Types: 1 Glasses of wine per week    Drug use: No     Family History   Problem Relation Name Age of Onset    Early death Mother  26        malpractice    Heart disease Father      Hypertension Father      Hyperlipidemia Father      Valvular heart disease Father       Current Outpatient Medications on File Prior to Visit   Medication Sig Dispense Refill    chlorhexidine (PERIDEX) 0.12 % solution Use as directed 15 mLs in the mouth or throat 2 (two) times daily.      [DISCONTINUED] amLODIPine (NORVASC) 10 MG tablet TAKE 1 TABLET BY MOUTH 1 TIME A DAY      [DISCONTINUED] nebivoloL (BYSTOLIC) 10 MG Tab Take 10 mg by mouth.      [DISCONTINUED] sildenafiL (VIAGRA) 100 MG tablet Take 100 mg by mouth once daily.      HYDROcodone-acetaminophen (NORCO) 5-325 mg per tablet Take 1 tablet by mouth every 4 (four) hours as needed for Pain. (Patient not taking: Reported on 7/31/2024) 15 tablet 0    ibuprofen (ADVIL,MOTRIN) 600 MG tablet Take 1 tablet (600 mg total) by mouth 3 (three) times daily. (Patient not taking: Reported on 7/31/2024) 45 tablet 1    ondansetron (ZOFRAN-ODT) 4 MG TbDL Take 1 tablet (4 mg total) by mouth every 6 (six) hours as needed (Nausea). (Patient not taking: Reported on 7/31/2024) 10 tablet 0    [DISCONTINUED] metoprolol tartrate (LOPRESSOR) 50 MG tablet Take 50 mg by mouth as needed. (Patient not taking: Reported on 7/31/2024)      [DISCONTINUED] mupirocin (BACTROBAN) 2 % ointment by Nasal route 2 (two) times daily.       Current Facility-Administered Medications on File Prior to Visit   Medication Dose Route Frequency Provider Last Rate Last Admin    HYDROmorphone injection 0.5 mg  0.5 mg Intravenous Q5 Min PRN Volpi-Abadie, Jacqueline, MD        lactated ringers infusion   Intravenous Continuous Volpi-Abadie, Jacqueline, MD   Stopped at 10/12/23 1304    LIDOcaine (PF) 10 mg/ml (1%) injection 1 mg  0.1 mL Intradermal Once Volpi-Abadie, Jacqueline, MD        LORazepam injection 0.25 mg  0.25  "mg Intravenous Q15 Min PRN Volpi-Abadie, Jacqueline, MD        oxyCODONE-acetaminophen 5-325 mg per tablet 1 tablet  1 tablet Oral Q3H PRN Volpi-Abadie, Jacqueline, MD        sodium chloride 0.9% flush 3 mL  3 mL Intravenous PRN Volpi-Abadie, Jacqueline, MD           Review of Systems   Constitutional:  Negative for chills, diaphoresis, fatigue and fever.   HENT:  Negative for congestion, ear pain, postnasal drip, sinus pain and sore throat.    Eyes:  Negative for pain and redness.   Respiratory:  Negative for cough, chest tightness and shortness of breath.    Cardiovascular:  Negative for chest pain and leg swelling.   Gastrointestinal:  Negative for abdominal pain, constipation, diarrhea, nausea and vomiting.   Genitourinary:  Negative for dysuria and hematuria.   Musculoskeletal:  Negative for arthralgias and joint swelling.   Skin:  Negative for rash.   Neurological:  Negative for dizziness, syncope and headaches.   Psychiatric/Behavioral:  Negative for dysphoric mood. The patient is not nervous/anxious.        Vitals:    07/31/24 1004 07/31/24 1109   BP: (!) 156/97 139/88   Pulse: 74    Resp: 18    SpO2: 98%    Weight: 108 kg (238 lb)    Height: 5' 11" (1.803 m)        Wt Readings from Last 3 Encounters:   07/31/24 108 kg (238 lb)   03/19/24 104.3 kg (229 lb 15 oz)   11/15/23 104.3 kg (229 lb 15 oz)       Physical Exam  Constitutional:       General: He is not in acute distress.     Appearance: Normal appearance. He is well-developed.   HENT:      Head: Normocephalic and atraumatic.   Eyes:      Conjunctiva/sclera: Conjunctivae normal.   Cardiovascular:      Rate and Rhythm: Normal rate and regular rhythm.      Pulses: Normal pulses.      Heart sounds: Normal heart sounds. No murmur heard.  Pulmonary:      Effort: Pulmonary effort is normal. No respiratory distress.      Breath sounds: Normal breath sounds.   Abdominal:      General: Bowel sounds are normal. There is no distension.      Palpations: Abdomen is " soft.      Tenderness: There is no abdominal tenderness.   Musculoskeletal:         General: Normal range of motion.      Cervical back: Normal range of motion and neck supple.   Skin:     General: Skin is warm and dry.      Findings: No rash.   Neurological:      General: No focal deficit present.      Mental Status: He is alert and oriented to person, place, and time.   Psychiatric:         Mood and Affect: Mood normal.         Behavior: Behavior normal.         Health Maintenance   Topic Date Due    Hepatitis C Screening  Never done    Shingles Vaccine (1 of 2) Never done    Lipid Panel  12/02/2025    TETANUS VACCINE  07/15/2031    Colorectal Cancer Screening  02/08/2034

## 2024-08-01 ENCOUNTER — TELEPHONE (OUTPATIENT)
Dept: FAMILY MEDICINE | Facility: CLINIC | Age: 54
End: 2024-08-01
Payer: COMMERCIAL

## 2024-08-01 NOTE — TELEPHONE ENCOUNTER
----- Message from Cony Gee PA-C sent at 7/31/2024 12:34 PM CDT -----  Please check on PA for Mounjaro. I am going to try Zepbound if denied.

## 2024-08-05 ENCOUNTER — TELEPHONE (OUTPATIENT)
Dept: FAMILY MEDICINE | Facility: CLINIC | Age: 54
End: 2024-08-05
Payer: COMMERCIAL

## 2024-08-08 ENCOUNTER — TELEPHONE (OUTPATIENT)
Dept: FAMILY MEDICINE | Facility: CLINIC | Age: 54
End: 2024-08-08
Payer: COMMERCIAL

## 2024-08-08 DIAGNOSIS — I10 PRIMARY HYPERTENSION: Primary | ICD-10-CM

## 2024-08-08 DIAGNOSIS — E34.9 HYPOTESTOSTERONEMIA: ICD-10-CM

## 2024-08-08 DIAGNOSIS — E78.5 HYPERLIPIDEMIA, UNSPECIFIED HYPERLIPIDEMIA TYPE: ICD-10-CM

## 2024-08-08 RX ORDER — ROSUVASTATIN CALCIUM 10 MG/1
10 TABLET, COATED ORAL DAILY
Qty: 90 TABLET | Refills: 3 | Status: SHIPPED | OUTPATIENT
Start: 2024-08-08 | End: 2025-08-08

## 2024-08-08 RX ORDER — LOSARTAN POTASSIUM 50 MG/1
50 TABLET ORAL DAILY
Qty: 90 TABLET | Refills: 3 | Status: SHIPPED | OUTPATIENT
Start: 2024-08-08 | End: 2025-08-08

## 2024-09-04 ENCOUNTER — TELEPHONE (OUTPATIENT)
Dept: FAMILY MEDICINE | Facility: CLINIC | Age: 54
End: 2024-09-04
Payer: COMMERCIAL

## 2024-09-04 DIAGNOSIS — E66.9 CLASS 1 OBESITY WITHOUT SERIOUS COMORBIDITY WITH BODY MASS INDEX (BMI) OF 33.0 TO 33.9 IN ADULT, UNSPECIFIED OBESITY TYPE: Primary | ICD-10-CM

## 2024-09-04 NOTE — TELEPHONE ENCOUNTER
I have signed for the following orders AND/OR meds.  Please call the patient and ask the patient to schedule the testing AND/OR inform about any medications that were sent. Medications have been sent to pharmacy listed below      No orders of the defined types were placed in this encounter.      Medications Ordered This Encounter   Medications    tirzepatide, weight loss, 5 mg/0.5 mL PnIj     Sig: Inject 5 mg into the skin every 7 days.     Dispense:  2 mL     Refill:  5         [facility] - [facility] [id number] [address] [address]  Phone: [phone] Fax: [phone]    [facility] - [address] [address]  Phone: [phone] Fax: [phone]

## 2024-09-17 ENCOUNTER — CLINICAL SUPPORT (OUTPATIENT)
Dept: FAMILY MEDICINE | Facility: CLINIC | Age: 54
End: 2024-09-17
Payer: COMMERCIAL

## 2024-09-17 ENCOUNTER — LAB VISIT (OUTPATIENT)
Dept: LAB | Facility: HOSPITAL | Age: 54
End: 2024-09-17
Attending: PHYSICIAN ASSISTANT
Payer: COMMERCIAL

## 2024-09-17 VITALS — SYSTOLIC BLOOD PRESSURE: 134 MMHG | HEART RATE: 70 BPM | DIASTOLIC BLOOD PRESSURE: 87 MMHG

## 2024-09-17 DIAGNOSIS — Z01.30 BP CHECK: Primary | ICD-10-CM

## 2024-09-17 DIAGNOSIS — I10 PRIMARY HYPERTENSION: ICD-10-CM

## 2024-09-17 LAB
ANION GAP SERPL CALC-SCNC: 12 MMOL/L (ref 8–16)
BUN SERPL-MCNC: 16 MG/DL (ref 6–20)
CALCIUM SERPL-MCNC: 11.3 MG/DL (ref 8.7–10.5)
CHLORIDE SERPL-SCNC: 100 MMOL/L (ref 95–110)
CO2 SERPL-SCNC: 29 MMOL/L (ref 23–29)
CREAT SERPL-MCNC: 1.2 MG/DL (ref 0.5–1.4)
EST. GFR  (NO RACE VARIABLE): >60 ML/MIN/1.73 M^2
GLUCOSE SERPL-MCNC: 100 MG/DL (ref 70–110)
POTASSIUM SERPL-SCNC: 3.3 MMOL/L (ref 3.5–5.1)
SODIUM SERPL-SCNC: 141 MMOL/L (ref 136–145)

## 2024-09-17 PROCEDURE — 36415 COLL VENOUS BLD VENIPUNCTURE: CPT | Mod: PO | Performed by: PHYSICIAN ASSISTANT

## 2024-09-17 PROCEDURE — 80048 BASIC METABOLIC PNL TOTAL CA: CPT | Performed by: PHYSICIAN ASSISTANT

## 2024-09-17 PROCEDURE — 99999 PR PBB SHADOW E&M-EST. PATIENT-LVL II: CPT | Mod: PBBFAC,,,

## 2024-09-18 ENCOUNTER — TELEPHONE (OUTPATIENT)
Dept: FAMILY MEDICINE | Facility: CLINIC | Age: 54
End: 2024-09-18
Payer: COMMERCIAL

## 2024-09-18 DIAGNOSIS — E87.6 HYPOKALEMIA: ICD-10-CM

## 2024-09-18 DIAGNOSIS — E83.52 HYPERCALCEMIA: ICD-10-CM

## 2024-09-18 DIAGNOSIS — K21.9 GASTROESOPHAGEAL REFLUX DISEASE, UNSPECIFIED WHETHER ESOPHAGITIS PRESENT: Primary | ICD-10-CM

## 2024-09-18 RX ORDER — PANTOPRAZOLE SODIUM 40 MG/1
40 TABLET, DELAYED RELEASE ORAL DAILY
Qty: 90 TABLET | Refills: 3 | Status: SHIPPED | OUTPATIENT
Start: 2024-09-18 | End: 2025-09-18

## 2024-09-18 NOTE — TELEPHONE ENCOUNTER
I already spoke to the patient about his labs. Potassium slightly improved, but still low. His calcium was also elevated, but this is likely from taking Tums daily. He is going to stop this medication. I have sent Protonix instead for acid reflux. I also ordered a repeat BMP in 1 month. Please make sure patient is aware and schedule the lab. Thank you.     I have signed for the following orders AND/OR meds.  Please call the patient and ask the patient to schedule the testing AND/OR inform about any medications that were sent. Medications have been sent to pharmacy listed below      Orders Placed This Encounter   Procedures    Basic Metabolic Panel     Standing Status:   Future     Standing Expiration Date:   11/17/2025     Order Specific Question:   Send normal result to authorizing provider's In Basket if patient is active on MyChart:     Answer:   Yes       Medications Ordered This Encounter   Medications    pantoprazole (PROTONIX) 40 MG tablet     Sig: Take 1 tablet (40 mg total) by mouth once daily.     Dispense:  90 tablet     Refill:  3         [facility] - [facility] [address] [facility]  [address] [facility] [address]  Phone: [phone] Fax: [phone]    [facility] - [facility] [address] [address]  Phone: [phone] Fax: [phone]

## 2024-10-11 ENCOUNTER — TELEPHONE (OUTPATIENT)
Dept: FAMILY MEDICINE | Facility: CLINIC | Age: 54
End: 2024-10-11
Payer: COMMERCIAL

## 2024-10-11 DIAGNOSIS — E66.811 CLASS 1 OBESITY WITHOUT SERIOUS COMORBIDITY WITH BODY MASS INDEX (BMI) OF 33.0 TO 33.9 IN ADULT, UNSPECIFIED OBESITY TYPE: Primary | ICD-10-CM

## 2024-10-17 ENCOUNTER — LAB VISIT (OUTPATIENT)
Dept: LAB | Facility: HOSPITAL | Age: 54
End: 2024-10-17
Attending: PHYSICIAN ASSISTANT
Payer: COMMERCIAL

## 2024-10-17 DIAGNOSIS — E83.52 HYPERCALCEMIA: ICD-10-CM

## 2024-10-17 DIAGNOSIS — E87.6 HYPOKALEMIA: ICD-10-CM

## 2024-10-17 LAB
ANION GAP SERPL CALC-SCNC: 7 MMOL/L (ref 8–16)
BUN SERPL-MCNC: 14 MG/DL (ref 6–20)
CALCIUM SERPL-MCNC: 9.2 MG/DL (ref 8.7–10.5)
CHLORIDE SERPL-SCNC: 103 MMOL/L (ref 95–110)
CO2 SERPL-SCNC: 30 MMOL/L (ref 23–29)
CREAT SERPL-MCNC: 1.1 MG/DL (ref 0.5–1.4)
EST. GFR  (NO RACE VARIABLE): >60 ML/MIN/1.73 M^2
GLUCOSE SERPL-MCNC: 111 MG/DL (ref 70–110)
POTASSIUM SERPL-SCNC: 3.3 MMOL/L (ref 3.5–5.1)
SODIUM SERPL-SCNC: 140 MMOL/L (ref 136–145)

## 2024-10-17 PROCEDURE — 36415 COLL VENOUS BLD VENIPUNCTURE: CPT | Mod: PO | Performed by: PHYSICIAN ASSISTANT

## 2024-10-17 PROCEDURE — 80048 BASIC METABOLIC PNL TOTAL CA: CPT | Performed by: PHYSICIAN ASSISTANT

## 2024-10-31 ENCOUNTER — TELEPHONE (OUTPATIENT)
Dept: FAMILY MEDICINE | Facility: CLINIC | Age: 54
End: 2024-10-31
Payer: COMMERCIAL

## 2024-10-31 DIAGNOSIS — S89.91XA INJURY OF RIGHT KNEE, INITIAL ENCOUNTER: Primary | ICD-10-CM

## 2024-10-31 RX ORDER — IBUPROFEN 800 MG/1
800 TABLET ORAL EVERY 8 HOURS PRN
Qty: 30 TABLET | Refills: 0 | Status: SHIPPED | OUTPATIENT
Start: 2024-10-31

## 2024-10-31 RX ORDER — TRAMADOL HYDROCHLORIDE 50 MG/1
50 TABLET ORAL EVERY 6 HOURS PRN
Qty: 20 TABLET | Refills: 0 | Status: SHIPPED | OUTPATIENT
Start: 2024-10-31

## 2024-10-31 RX ORDER — HYDROCODONE BITARTRATE AND ACETAMINOPHEN 10; 325 MG/1; MG/1
1 TABLET ORAL EVERY 6 HOURS PRN
Qty: 20 TABLET | Refills: 0 | Status: SHIPPED | OUTPATIENT
Start: 2024-10-31 | End: 2024-10-31

## 2024-10-31 RX ORDER — HYDROCODONE BITARTRATE AND ACETAMINOPHEN 10; 325 MG/1; MG/1
1 TABLET ORAL EVERY 6 HOURS PRN
Qty: 20 TABLET | Refills: 0 | Status: SHIPPED | OUTPATIENT
Start: 2024-10-31 | End: 2024-10-31 | Stop reason: ALTCHOICE

## 2024-10-31 RX ORDER — HYDROCODONE BITARTRATE AND ACETAMINOPHEN 10; 325 MG/1; MG/1
1 TABLET ORAL EVERY 6 HOURS PRN
Qty: 20 TABLET | Refills: 0 | Status: SHIPPED | OUTPATIENT
Start: 2024-10-31 | End: 2024-10-31 | Stop reason: SDUPTHER

## 2024-11-13 ENCOUNTER — TELEPHONE (OUTPATIENT)
Dept: FAMILY MEDICINE | Facility: CLINIC | Age: 54
End: 2024-11-13
Payer: COMMERCIAL

## 2024-11-13 DIAGNOSIS — S89.91XA INJURY OF RIGHT KNEE, INITIAL ENCOUNTER: Primary | ICD-10-CM

## 2024-11-13 RX ORDER — TRAMADOL HYDROCHLORIDE 50 MG/1
50 TABLET ORAL EVERY 6 HOURS PRN
Qty: 20 TABLET | Refills: 0 | Status: SHIPPED | OUTPATIENT
Start: 2024-11-13

## 2024-11-13 NOTE — TELEPHONE ENCOUNTER
I have signed for the following orders AND/OR meds.  Please call the patient and ask the patient to schedule the testing AND/OR inform about any medications that were sent. Medications have been sent to pharmacy listed below      No orders of the defined types were placed in this encounter.      Medications Ordered This Encounter   Medications    traMADoL (ULTRAM) 50 mg tablet     Sig: Take 1 tablet (50 mg total) by mouth every 6 (six) hours as needed for Pain.     Dispense:  20 tablet     Refill:  0     Quantity prescribed more than 7 day supply? No     Order Specific Question:   I have reviewed the Prescription Drug Monitoring Program (PDMP) database for this patient prior to prescribing the above opioid medication     Answer:   Yes         Merged with [facility] [place], [place] - [id number] Pending sale to [facility]  [id number] Pending sale to [facility]  [place] [address]  Phone: [phone] Fax: [phone]

## 2025-01-06 ENCOUNTER — TELEPHONE (OUTPATIENT)
Dept: FAMILY MEDICINE | Facility: CLINIC | Age: 55
End: 2025-01-06
Payer: COMMERCIAL

## 2025-01-06 DIAGNOSIS — E66.811 CLASS 1 OBESITY WITHOUT SERIOUS COMORBIDITY WITH BODY MASS INDEX (BMI) OF 33.0 TO 33.9 IN ADULT, UNSPECIFIED OBESITY TYPE: Primary | ICD-10-CM

## 2025-01-06 NOTE — TELEPHONE ENCOUNTER
I have signed for the following orders AND/OR meds.  Please call the patient and ask the patient to schedule the testing AND/OR inform about any medications that were sent. Medications have been sent to pharmacy listed below      No orders of the defined types were placed in this encounter.      Medications Ordered This Encounter   Medications    tirzepatide, weight loss, 5 mg/0.5 mL PnIj     Sig: Inject 5 mg into the skin every 7 days.     Dispense:  2 mL     Refill:  5         [facility] [place] [facility] [address] [facility] [address] [address] [place] [address]  Phone: [phone] Fax: [phone]

## 2025-04-17 LAB — CRC RECOMMENDATION EXT: NORMAL

## 2025-04-30 ENCOUNTER — LAB VISIT (OUTPATIENT)
Dept: LAB | Facility: HOSPITAL | Age: 55
End: 2025-04-30
Attending: PHYSICIAN ASSISTANT
Payer: COMMERCIAL

## 2025-04-30 ENCOUNTER — OFFICE VISIT (OUTPATIENT)
Dept: FAMILY MEDICINE | Facility: CLINIC | Age: 55
End: 2025-04-30
Payer: COMMERCIAL

## 2025-04-30 VITALS
SYSTOLIC BLOOD PRESSURE: 167 MMHG | DIASTOLIC BLOOD PRESSURE: 103 MMHG | HEART RATE: 62 BPM | HEIGHT: 71 IN | BODY MASS INDEX: 32.87 KG/M2 | TEMPERATURE: 98 F | WEIGHT: 234.81 LBS | RESPIRATION RATE: 18 BRPM

## 2025-04-30 DIAGNOSIS — R73.03 PREDIABETES: ICD-10-CM

## 2025-04-30 DIAGNOSIS — E78.5 HYPERLIPIDEMIA, UNSPECIFIED HYPERLIPIDEMIA TYPE: ICD-10-CM

## 2025-04-30 DIAGNOSIS — I25.10 CORONARY ARTERY DISEASE INVOLVING NATIVE CORONARY ARTERY OF NATIVE HEART WITHOUT ANGINA PECTORIS: ICD-10-CM

## 2025-04-30 DIAGNOSIS — I10 PRIMARY HYPERTENSION: ICD-10-CM

## 2025-04-30 DIAGNOSIS — Z09 HOSPITAL DISCHARGE FOLLOW-UP: Primary | ICD-10-CM

## 2025-04-30 DIAGNOSIS — Z85.038 HISTORY OF COLON CANCER: ICD-10-CM

## 2025-04-30 LAB
ABSOLUTE EOSINOPHIL (OHS): 0.21 K/UL
ABSOLUTE MONOCYTE (OHS): 0.72 K/UL (ref 0.3–1)
ABSOLUTE NEUTROPHIL COUNT (OHS): 3.82 K/UL (ref 1.8–7.7)
ALBUMIN SERPL BCP-MCNC: 3.7 G/DL (ref 3.5–5.2)
ALP SERPL-CCNC: 54 UNIT/L (ref 40–150)
ALT SERPL W/O P-5'-P-CCNC: 24 UNIT/L (ref 10–44)
ANION GAP (OHS): 11 MMOL/L (ref 8–16)
AST SERPL-CCNC: 21 UNIT/L (ref 11–45)
BASOPHILS # BLD AUTO: 0.08 K/UL
BASOPHILS NFR BLD AUTO: 1 %
BILIRUB SERPL-MCNC: 0.7 MG/DL (ref 0.1–1)
BUN SERPL-MCNC: 15 MG/DL (ref 6–20)
CALCIUM SERPL-MCNC: 9.1 MG/DL (ref 8.7–10.5)
CHLORIDE SERPL-SCNC: 104 MMOL/L (ref 95–110)
CO2 SERPL-SCNC: 28 MMOL/L (ref 23–29)
CORTIS SERPL-MCNC: 9.5 UG/DL (ref 4.3–22.4)
CREAT SERPL-MCNC: 1.1 MG/DL (ref 0.5–1.4)
EAG (OHS): 117 MG/DL (ref 68–131)
ERYTHROCYTE [DISTWIDTH] IN BLOOD BY AUTOMATED COUNT: 13 % (ref 11.5–14.5)
GFR SERPLBLD CREATININE-BSD FMLA CKD-EPI: >60 ML/MIN/1.73/M2
GLUCOSE SERPL-MCNC: 128 MG/DL (ref 70–110)
HBA1C MFR BLD: 5.7 % (ref 4–5.6)
HCT VFR BLD AUTO: 48.2 % (ref 40–54)
HGB BLD-MCNC: 15.6 GM/DL (ref 14–18)
IMM GRANULOCYTES # BLD AUTO: 0.02 K/UL (ref 0–0.04)
IMM GRANULOCYTES NFR BLD AUTO: 0.3 % (ref 0–0.5)
LYMPHOCYTES # BLD AUTO: 2.83 K/UL (ref 1–4.8)
MCH RBC QN AUTO: 29.7 PG (ref 27–31)
MCHC RBC AUTO-ENTMCNC: 32.4 G/DL (ref 32–36)
MCV RBC AUTO: 92 FL (ref 82–98)
NUCLEATED RBC (/100WBC) (OHS): 0 /100 WBC
PLATELET # BLD AUTO: 303 K/UL (ref 150–450)
PMV BLD AUTO: 10.7 FL (ref 9.2–12.9)
POTASSIUM SERPL-SCNC: 4 MMOL/L (ref 3.5–5.1)
PROT SERPL-MCNC: 7 GM/DL (ref 6–8.4)
RBC # BLD AUTO: 5.25 M/UL (ref 4.6–6.2)
RELATIVE EOSINOPHIL (OHS): 2.7 %
RELATIVE LYMPHOCYTE (OHS): 36.8 % (ref 18–48)
RELATIVE MONOCYTE (OHS): 9.4 % (ref 4–15)
RELATIVE NEUTROPHIL (OHS): 49.8 % (ref 38–73)
SODIUM SERPL-SCNC: 143 MMOL/L (ref 136–145)
TSH SERPL-ACNC: 1.35 UIU/ML (ref 0.4–4)
WBC # BLD AUTO: 7.68 K/UL (ref 3.9–12.7)

## 2025-04-30 PROCEDURE — 84443 ASSAY THYROID STIM HORMONE: CPT

## 2025-04-30 PROCEDURE — 83036 HEMOGLOBIN GLYCOSYLATED A1C: CPT

## 2025-04-30 PROCEDURE — 82533 TOTAL CORTISOL: CPT

## 2025-04-30 PROCEDURE — 99999 PR PBB SHADOW E&M-EST. PATIENT-LVL V: CPT | Mod: PBBFAC,,, | Performed by: PHYSICIAN ASSISTANT

## 2025-04-30 PROCEDURE — 83835 ASSAY OF METANEPHRINES: CPT

## 2025-04-30 PROCEDURE — 85025 COMPLETE CBC W/AUTO DIFF WBC: CPT

## 2025-04-30 PROCEDURE — 36415 COLL VENOUS BLD VENIPUNCTURE: CPT | Mod: PO

## 2025-04-30 PROCEDURE — 84244 ASSAY OF RENIN: CPT

## 2025-04-30 PROCEDURE — 80053 COMPREHEN METABOLIC PANEL: CPT

## 2025-04-30 RX ORDER — NEBIVOLOL 20 MG/1
1 TABLET ORAL DAILY
Qty: 90 TABLET | Refills: 3 | Status: SHIPPED | OUTPATIENT
Start: 2025-04-30 | End: 2026-04-30

## 2025-04-30 RX ORDER — OLMESARTAN MEDOXOMIL 40 MG/1
40 TABLET ORAL DAILY
Qty: 90 TABLET | Refills: 3 | Status: SHIPPED | OUTPATIENT
Start: 2025-04-30 | End: 2026-04-30

## 2025-04-30 NOTE — PATIENT INSTRUCTIONS
Chris Muse,     If you are due for any health screening(s) below please notify me so we can arrange them to be ordered and scheduled. Most healthy patients at your age complete them, but you are free to accept or refuse.     If you can't do it, I'll definitely understand. If you can, I'd certainly appreciate it!    All of your core healthy metrics are met.      Lets manage your high blood pressure     Your blood pressure was above 140/90 today during your visit. We recommend that you schedule a nurse visit in two weeks to check your blood pressure and discuss ways to support your health goals.     You can also manage your health and record your blood pressure from the comfort of home by keeping a daily blood pressure log. These results are shared with and reviewed by your provider. Please print this form (Daily Blood Pressure Log) to assist you in keeping track of your blood pressure at home.     Schedule your nurse visit in two weeks to learn more about how to track and manage high blood pressure.    Daily Blood Pressure Log    Name:__________________________________                  Date of Birth:_________    Average Blood Pressure:  __________      Date: Time  (a.m.) Blood  Pressure: Pulse  Rate: Time  (p.m.) Blood  Pressure : Pulse  Rate:   Sample 8:37 127/83 84

## 2025-04-30 NOTE — PROGRESS NOTES
Transitional Care Note  Family and/or Caretaker present at visit?  No  Diagnostic tests reviewed/disposition: No diagnosic tests pending after this hospitalization.  Disease/illness education: he understands what he had.  Home health/community services discussion/referrals: Patient does not have home health established from hospital visit. They do not need home health. If needed, we will set up home health for the patient.   Establishment or re-establishment of referral orders for community resources: No other necessary community resources.   Discussion with other health care providers: No discussion with other health care providers necessary.     Assessment/Plan:    1. Hospital discharge follow-up    2. Coronary artery disease involving native coronary artery of native heart without angina pectoris  Overview:  -[facility] 3/3/25-very mild nonobstructive coronary artery disease and normal left ventricular function  -remains on ASA and statin  -denies any current chest pain or shortness of breath  -followed by cardiology (Dr. Barlow)      3. Primary hypertension  Overview:  Hypertension Medications              losartan (COZAAR) 100 MG tablet Take 1 tablet (100 mg total) by mouth once daily.    nebivoloL (BYSTOLIC) 20 MG Tab Take 1 tablet (20 mg total) by mouth once daily.        -above goal today; has been above goal on recent home checks  -of note, previously on Amlodipine, but switched to Losartan due to low potassium  -will try switching from Losartan to Olmesartan per patient request before adding additional medication  -consider restarting Amlodipine in the future needed  -checking labs today   -monitor BP closely at home; BP check in 2 weeks  -continue lifestyle modification with low sodium diet and exercise   -discussed hypertension disease course and importance of treating high blood pressure  -patient understood and advised of risk of untreated blood pressure. ER precautions were given for symptoms of  hypertensive urgency and emergency.      Orders:  -     CBC Auto Differential; Future; Expected date: 04/30/2025  -     Comprehensive Metabolic Panel; Future; Expected date: 04/30/2025  -     TSH; Future; Expected date: 04/30/2025  -     olmesartan (BENICAR) 40 MG tablet; Take 1 tablet (40 mg total) by mouth once daily.  Dispense: 90 tablet; Refill: 3  -     nebivoloL (BYSTOLIC) 20 mg Tab; Take 1 tablet (20 mg total) by mouth once daily.  Dispense: 90 tablet; Refill: 3  -     Metanephrines, Plasma Free; Future; Expected date: 04/30/2025  -     Aldosterone/Renin Activity Ratio; Future; Expected date: 04/30/2025  -     Cortisol, 8AM; Future; Expected date: 04/30/2025    4. Hyperlipidemia, unspecified hyperlipidemia type  Overview:  Hyperlipidemia Medications              rosuvastatin (CRESTOR) 10 MG tablet Take 1 tablet (10 mg total) by mouth once daily.          -chronic condition. Currently stable.    -reports compliance with hyperlipidemia treatment as prescribed  -denies any known adverse effects of medications  -most recent labs listed below:  Lab Results   Component Value Date    CHOL 113 03/03/2025     Lab Results   Component Value Date    HDL 29 03/03/2025     Lab Results   Component Value Date    LDLCALC 68 03/03/2025     Lab Results   Component Value Date    TRIG 79 03/03/2025     Lab Results   Component Value Date    ALT 15 03/02/2025    AST 16 03/02/2025    ALKPHOS 61 03/02/2025    BILITOT 0.5 03/02/2025         5. Prediabetes  Overview:  Lab Results   Component Value Date    HGBA1C 6.1 (H) 07/31/2024   -managed with diet and exercis  -due for repeat A1c    Orders:  -     Hemoglobin A1C; Future; Expected date: 04/30/2025    6. History of colon cancer  Overview:  -mass of sigmoid colon found on c-scope in 1/2024  -s/p excision of mass at that time   -recently completed repeat c-scope last week (4/2025) which was normal per patient report  -plans for yearly surveillence  -followed closely by GI (  Selin)  -requesting records        Follow up in about 2 weeks (around 5/14/2025), or if symptoms worsen or fail to improve, for BP check.    Cony Gee PA-C  _____________________________________________________________________________________________________________________________________________________    CC: hospital follow up    HPI: Patient is in clinic today as an established patient here for hospital follow up.    [facility] DISCHARGE SUMMARY    Patient ID:  Micha Bolton  [id number]  54 y.o.  1970    Admit Date:   3/2/2025 10:50 AM    Discharge Date:   Discharge Today: 3/3/2025    Admitting Physician:   Leigh Rodriguez MD     Current Attending:   No att. providers found    Consultants/Treatment Team:  Consultants     Provider Service Role Specialty   Vianey Barlow MD   Cardiology Surgeon Cardiology   Vianey Barlow MD   Cardiology Consulting Physician Cardiology         Reason for Admission/Admission Diagnoses:   Present on Admission:  Acute chest pain  Primary hypertension  HARSHA (obstructive sleep apnea)  Hyperlipidemia    Discharge Diagnoses:   Active Hospital Problems   Diagnosis Date Noted   Acute chest pain 03/02/2025   Primary hypertension 03/02/2025   HARSHA (obstructive sleep apnea) 03/02/2025   Hyperlipidemia 03/02/2025     Resolved Hospital Problems     History of Present Illness:     The patient is a 54-year-old gentleman who comes in complaining of intermittent chest pain for about 2 weeks. He had contacted Dr. Barlow about it and was supposed to have an outpatient heart catheterization but the last 3 days his blood pressure has been unusually elevated despite him taking his blood pressure meds which consist of losartan and Bystolic therefore he came to the emergency room.    The patient had a heart catheterization done a year ago that showed that the LAD has nonobstructive calcium in the proximal vessel with mid vessel calcified narrowing around 50%. He has a history of  hyperlipidemia and his LDL 7 months ago was 138. He does not smoke but has a family history of premature coronary artery disease.    Hospital Course and Treatment:   Admission Information     Date & Time  3/2/2025 [facility] Observation Unit Dept. Phone  [phone]           The patient was admitted to a telemetry unit. He ruled out for an MI and underwent a heart catheterization today that showed mild nonobstructive CAD.    The patient continues to have elevated blood pressure although not as high as yesterday. We increased his losartan and Bystolic. I asked the patient to check his blood pressure daily.    I discussed with the patient disease process and treatment.     I have personally seen and examined the patient, Micha Bolton, in a face to face encounter on the date of discharge.     He is cleared for discharge with instructions to follow up as directed.   Total time in the care and discharge planning of this patient was greater than 30 minutes.    Significant Diagnostic Studies:  Recent Imaging and Procedure Results     Procedure Component Value Ref Range Date/Time   CATH LAB ONLY-Left Heart Cath* [[id number]] (Normal) Collected: 03/03/2025 0920   Updated: 03/03/2025 1437   Narrative:   PROCEDURES: Left heart catheterization, coronary angiography, left   ventriculography, and conscious sedation.    INDICATIONS FOR PROCEDURE: Unstable angina.    PROCEDURE IN DETAIL: Informed consent was obtained. He was brought to   the cardiac cath lab. The right wrist was prepped and draped in the usual   sterile fashion. Local anesthesia was infiltrated. Arterial access was   obtained. Intraarterial   nitroglycerin, verapamil and heparin were given. A left ventriculogram   was performed using the Tiger catheter. Barry right-4 catheter was   used to perform right coronary angiography. After experimenting with   multiple catheters, I was finally able to  cannulate the left coronary artery using  an EBU-4 guide catheter. A left   coronary angiogram was performed in multiple projections. All catheters   were removed and hemostasis was obtained using a TR band. The patient   tolerated the procedure well. He   received multiple doses of intravenous Versed and fentanyl for sedation.    FINDINGS:  1. Hemodynamics: Left ventricular pressure is 126/7. Aortic pressure   128/87. No pullback gradient.  2. Ventriculography showed normal left ventricular function with   ejection fraction 60%.  3. Coronary angiography. The left ventricle is normal. It trifurcates   into the circumflex and the ramus intermedius. The circumflex is a   moderate size, non-dominant vessel that is free of significant disease.   The ramus intermedius is a   moderate-sized vessel that is free of significant disease. The LAD is a   large wraparound vessel with minimal disease in its proximal portion and   no more than 25% stenosis. The diagonal and septal branches are free of   significant disease. The right   coronary artery is a large dominant vessel that is free of significant   disease.    CONCLUSIONS:   1. Very mild nonobstructive coronary artery disease.  2. Normal left ventricular function.    Vianey Barlow MD  V# [id number] D# [id number]  D: gm/WT: OPRPT/T: pra  DD: 03/03/2025 09:20 TD: 03/03/2025 10:37         Surgical Procedures during this visit:    Procedure(s):  LEFT HEART CATHETERIZATION  Date  3/3/2025  Primary Surgeon  Vianey Barlow MD  -------------------    Patient's Condition On Discharge:   Stable    Physical Exam  Vitals and nursing note reviewed.   HENT:   Head: Normocephalic and atraumatic.   Mouth/Throat:   Mouth: Mucous membranes are moist.   Pharynx: Oropharynx is clear.   Eyes:   Pupils: Pupils are equal, round, and reactive to light.   Cardiovascular:   Rate and Rhythm: Normal rate and regular rhythm.   Heart sounds: Normal heart sounds.   Pulmonary:   Effort: Pulmonary effort is normal.   Breath sounds: Normal  breath sounds.   Abdominal:   General: Abdomen is flat. Bowel sounds are normal. There is no distension.   Palpations: Abdomen is soft.   Musculoskeletal:   General: No swelling or deformity. Normal range of motion.   Cervical back: Normal range of motion and neck supple.   Skin:  General: Skin is warm and dry.   Capillary Refill: Capillary refill takes less than 2 seconds.   Neurological:   General: No focal deficit present.   Mental Status: He is alert and oriented to person, place, and time. Mental status is at baseline.   Psychiatric:   Mood and Affect: Mood normal.   Behavior: Behavior normal.   Thought Content: Thought content normal.   Judgment: Judgment normal.     Discharge Disposition:   Order for Discharge (From admission, onward)     Start Ordered   03/03/25 1150 Discharge Disposition to: Home or Self Care Once   Expected Discharge Date: 03/03/25     Question: Discharge Disposition to Answer: Home or Self Care   03/03/25 1149                 Discharge Orders:    Future Appointments:    Immunizations Administered for This Admission     No immunizations given this admission.           Medication List       CHANGE how you take these medications     losartan 50 MG Tab tablet  Commonly known as: COZAAR  Take 2 tablets (100 mg total) by mouth daily  What changed: how much to take    nebivoloL 10 MG Tab tablet  Commonly known as: BYSTOLIC  Take 2 tablets (20 mg total) by mouth daily  What changed: how much to take          CONTINUE taking these medications     albuterol 90 mcg/actuation Hfaa inhaler  Commonly known as: Proventil HFA  Inhale 2 puffs into the lungs every 6 (six) hours as needed for Wheezing or Shortness of Breath (persistent cough, chest tightness)    aspirin 81 MG Chew chewable tablet    rosuvastatin 10 MG Tab tablet  Commonly known as: CRESTOR    sildenafiL 100 MG Tab tablet  Commonly known as: VIAGRA          STOP taking these medications     amLODIPine 5 MG Tab tablet  Commonly known as:  NORVASC    azithromycin 250 MG Tab tablet  Commonly known as: Zithromax Z-Gustavo    benzonatate 100 MG Cap capsule  Commonly known as: Tessalon Perles    hydroCHLOROthiazide 12.5 mg Cap capsule  Commonly known as: MICROZIDE    mupirocin 2 % Oint topical ointment  Commonly known as: BACTROBAN            Where to Get Your Medications       Information about where to get these medications is not yet available   Ask your nurse or doctor about these medications  losartan 50 MG Tab tablet  nebivoloL 10 MG Tab tablet      Discharge Orders     Future Labs/Procedures Expected by Expires   Activity as tolerated As directed   Diet (Select type) Cardiac/Low Chol/MU As directed   Questions:   Diet Type: Cardiac/Low Chol/MU   Restriction(s):   Solid Consistency:   Liquid Consistency:   Sodium Restriction:   Fluid restriction:   Follow-up with PCP Weeks (1) As directed   Questions:   Schedule for:   when: Weeks Comment - 1     History of Present Illness    CHIEF COMPLAINT:  Mr. Bolton presents today for follow up of recent hospitalization and elevated blood pressure.    HYPERTENSION:  He reports consistently elevated blood pressure readings with systolic in 170s-200s range and diastolic values in the 90s-120s range since last month. He took his blood pressure medications at 6 AM today. Despite elevated readings, he denies symptoms such as headache, vision changes, chest pain, shortness of breath, dizziness, palpitations. His medications were adjusted while in the hospital last month with Bystolic increased from 10 mg to 20 mg and Losartan increased from 50 mg to 100 mg. He denies any current side effects.     CARDIOVASCULAR:  Cardiac angiogram on March 3rd showed very mild non-obstructive coronary artery disease with minimal disease in proximal LAD and no more than 25% stenosis.    GASTROINTESTINAL:  Colonoscopy last week showed normal results.    Remaining chronic conditions have been reviewed and remain stable. Further detail as  stated above.     No other complaints today.     Past Medical History:   Diagnosis Date    General anesthetics causing adverse effect in therapeutic use     hard to arouse    Hypertension     Primary localized osteoarthrosis of left shoulder     Sleep apnea      Past Surgical History:   Procedure Laterality Date    ARTHROPLASTY OF SHOULDER Left 12/17/2020    Procedure: ARTHROPLASTY, SHOULDER;  Surgeon: Horacio Rodríguez II, MD;  Location: [facility] OR;  Service: Orthopedics;  Laterality: Left;    INSERTION, NEUROSTIMULATOR, HYPOGLOSSAL Right 10/12/2023    Procedure: INSERTION,NEUROSTIMULATOR,HYPOGLOSSAL;  Surgeon: Roman Macias MD;  Location: [facility] OR;  Service: ENT;  Laterality: Right;    LAPAROSCOPIC CHOLECYSTECTOMY N/A 7/21/2021    Procedure: CHOLECYSTECTOMY, LAPAROSCOPIC;  Surgeon: Sally Yan MD;  Location: [facility] OR;  Service: General;  Laterality: N/A;    LAPAROSCOPIC REPAIR OF INGUINAL HERNIA Bilateral 2009    SHOULDER SURGERY Left 02/18/2019    posterior labral repair; extensive debridement of synovium and cuff with SAD    SLEEP ENDOSCOPY, DRUG-INDUCED Bilateral 6/2/2023    Procedure: SLEEP ENDOSCOPY,DRUG-INDUCED;  Surgeon: Roman Macias MD;  Location: [facility] OR;  Service: ENT;  Laterality: Bilateral;     Review of patient's allergies indicates:   Allergen Reactions    Pcn [penicillins]      As a child, okay to take Keflex per patient     Social History[1]  Family History   Problem Relation Name Age of Onset    Early death Mother  26        malpractice    Heart disease Father      Hypertension Father      Hyperlipidemia Father      Valvular heart disease Father       Medications Ordered Prior to Encounter[2]    Review of Systems   Constitutional:  Negative for chills, diaphoresis, fatigue and fever.   HENT:  Negative for congestion, ear pain, postnasal drip, sinus pain and sore throat.    Eyes:  Negative for pain and redness.   Respiratory:  Negative for cough, chest tightness and shortness of  "breath.    Cardiovascular:  Negative for chest pain and leg swelling.   Gastrointestinal:  Negative for abdominal pain, constipation, diarrhea, nausea and vomiting.   Genitourinary:  Negative for dysuria and hematuria.   Musculoskeletal:  Negative for arthralgias and joint swelling.   Skin:  Negative for rash.   Neurological:  Negative for dizziness, syncope and headaches.   Psychiatric/Behavioral:  Negative for dysphoric mood. The patient is not nervous/anxious.        Vitals:    04/30/25 0800 04/30/25 0801   BP: (!) 159/98 (!) 167/103   BP Location: Left arm    Patient Position: Sitting    Pulse: 62    Resp: 18    Temp: 97.5 °F (36.4 °C)    Weight: 106.5 kg (234 lb 12.8 oz)    Height: 5' 11" (1.803 m)        Wt Readings from Last 3 Encounters:   04/30/25 106.5 kg (234 lb 12.8 oz)   11/11/24 102.1 kg (225 lb)   07/31/24 108 kg (238 lb)       Physical Exam  Constitutional:       General: He is not in acute distress.     Appearance: Normal appearance. He is well-developed.   HENT:      Head: Normocephalic and atraumatic.   Eyes:      Conjunctiva/sclera: Conjunctivae normal.   Cardiovascular:      Rate and Rhythm: Normal rate and regular rhythm.      Pulses: Normal pulses.      Heart sounds: Normal heart sounds. No murmur heard.  Pulmonary:      Effort: Pulmonary effort is normal. No respiratory distress.      Breath sounds: Normal breath sounds.   Abdominal:      General: Bowel sounds are normal. There is no distension.      Palpations: Abdomen is soft.      Tenderness: There is no abdominal tenderness.   Musculoskeletal:         General: Normal range of motion.      Cervical back: Normal range of motion and neck supple.   Skin:     General: Skin is warm and dry.      Findings: No rash.   Neurological:      General: No focal deficit present.      Mental Status: He is alert and oriented to person, place, and time.   Psychiatric:         Mood and Affect: Mood normal.         Behavior: Behavior normal.         Health " Maintenance   Topic Date Due    Hepatitis C Screening  Never done    Shingles Vaccine (1 of 2) Never done    Pneumococcal Vaccines (Age 50+) (1 of 2 - PCV) Never done    COVID-19 Vaccine (3 - Pfizer risk series) 09/02/2021    Influenza Vaccine (1) Never done    HIV Screening  07/31/2030 (Originally [birth year]/10/1986)    Hemoglobin A1c (Prediabetes)  07/31/2025    High Dose Statin  04/30/2026    Lipid Panel  03/03/2030    TETANUS VACCINE  07/15/2031    Colorectal Cancer Screening  04/17/2035    RSV Vaccine (Age 60+ and Pregnant patients) (1 - 1-dose 75+ series) 08/10/2046     DISCLAIMER: This note was compiled by using a speech recognition dictation system and therefore please be aware that typographical / speech recognition errors can and do occur.  Please contact me if you see any errors specifically.  Consent was obtained for [employer] recording system prior to the visit.            [1]   Social History  Tobacco Use    Smoking status: Never    Smokeless tobacco: Never   Substance Use Topics    Alcohol use: Yes     Alcohol/week: 1.0 standard drink of alcohol     Types: 1 Glasses of wine per week    Drug use: No   [2]   Current Outpatient Medications on File Prior to Visit   Medication Sig Dispense Refill    ondansetron (ZOFRAN-ODT) 4 MG TbDL Take 1 tablet (4 mg total) by mouth every 6 (six) hours as needed (Nausea). 10 tablet 0    pantoprazole (PROTONIX) 40 MG tablet Take 1 tablet (40 mg total) by mouth once daily. 90 tablet 3    rosuvastatin (CRESTOR) 10 MG tablet Take 1 tablet (10 mg total) by mouth once daily. 90 tablet 3    sildenafiL (VIAGRA) 100 MG tablet Take 1 tablet (100 mg total) by mouth once daily. 30 tablet 11    [DISCONTINUED] losartan (COZAAR) 50 MG tablet Take 1 tablet (50 mg total) by mouth once daily. (Patient taking differently: Take 100 mg by mouth once daily.) 90 tablet 3    [DISCONTINUED] nebivoloL (BYSTOLIC) 10 MG Tab Take 1 tablet (10 mg total) by mouth once daily. (Patient taking differently: Take  20 mg by mouth once daily.) 90 tablet 3    chlorhexidine (PERIDEX) 0.12 % solution Use as directed 15 mLs in the mouth or throat 2 (two) times daily. (Patient not taking: Reported on 4/30/2025)      fluticasone propionate (FLONASE) 50 mcg/actuation nasal spray Use 2 sprays to each nostril daily (Patient not taking: Reported on 4/30/2025) 16 g 11    ibuprofen (ADVIL,MOTRIN) 600 MG tablet Take 1 tablet (600 mg total) by mouth 3 (three) times daily. (Patient not taking: Reported on 4/30/2025) 45 tablet 1    ibuprofen (ADVIL,MOTRIN) 800 MG tablet Take 1 tablet (800 mg total) by mouth every 8 (eight) hours as needed for Pain. (Patient not taking: Reported on 4/30/2025) 30 tablet 0    ibuprofen (ADVIL,MOTRIN) 800 MG tablet Take 1 tablet (800 mg total) by mouth every 12 (twelve) hours as needed for Pain. (Patient not taking: Reported on 4/30/2025) 60 tablet 1    tirzepatide, weight loss, 5 mg/0.5 mL PnIj Inject 5 mg into the skin every 7 days. (Patient not taking: Reported on 4/30/2025) 2 mL 5    traMADoL (ULTRAM) 50 mg tablet Take 1 tablet (50 mg total) by mouth every 6 (six) hours as needed for Pain. (Patient not taking: Reported on 4/30/2025) 20 tablet 0    triamcinolone acetonide 0.1% (KENALOG) 0.1 % cream Apply topically 2 (two) times daily. (Patient not taking: Reported on 4/30/2025) 80 g 0     Current Facility-Administered Medications on File Prior to Visit   Medication Dose Route Frequency Provider Last Rate Last Admin    HYDROmorphone injection 0.5 mg  0.5 mg Intravenous Q5 Min PRN Volpi-Abadie, Jacqueline, MD        lactated ringers infusion   Intravenous Continuous Volpi-Abadie, Jacqueline, MD   Stopped at 10/12/23 1304    LIDOcaine (PF) 10 mg/ml (1%) injection 1 mg  0.1 mL Intradermal Once Volpi-Abadie, Jacqueline, MD        LORazepam injection 0.25 mg  0.25 mg Intravenous Q15 Min PRN Volpi-Abadie, Jacqueline, MD        oxyCODONE-acetaminophen 5-325 mg per tablet 1 tablet  1 tablet Oral Q3H PRN Volpi-Abadie,  MD Mitzy        sodium chloride 0.9% flush 3 mL  3 mL Intravenous PRN Volpi-Abadie, Jacqueline, MD

## 2025-04-30 NOTE — Clinical Note
Patient had colonoscopy last week with Dr. Smallwood @ [facility]. Can we please request these records?

## 2025-05-01 ENCOUNTER — PATIENT OUTREACH (OUTPATIENT)
Dept: ADMINISTRATIVE | Facility: HOSPITAL | Age: 55
End: 2025-05-01
Payer: COMMERCIAL

## 2025-05-01 NOTE — PROGRESS NOTES
[facility] e faxed for all colonoscopy/pathology claim dates. Signed Authorization attached, Reminder set

## 2025-05-01 NOTE — LETTER
AUTHORIZATION FOR RELEASE OF   CONFIDENTIAL INFORMATION        We are seeing Micha Bolton, date of birth 1970, in the clinic at [facility]. Vira Aguila MD is the patient's PCP. Micha Bolton has an outstanding lab/procedure at the time we reviewed his chart. In order to help keep his health information updated, he has authorized us to request the following medical record(s):                                             ( x )  COLONOSCOPY - , ,   (x) Pathology Report  Recommendation for repeat colonoscopy in ______ years            Please fax records to [facility], Watts, Kacie S., MD,  at [phone] or email to [email or web address].      Patient Name: Micha Bolton  : 1970  Patient Phone #: [phone]                  Micha Bolton  MRN: [id number]  : 1970  Age: 53 y.o.  Sex: male         Patient/Legal Guardian Signature  This signature was collected at 2024           _______________________________   Printed Name/Relationship to Patient      Consent for Examination and Treatment: I hereby authorize the providers and employees of [facility] ([facility]) to provide medical treatment/services which includes, but is not limited to, performing and administering tests and diagnostic procedures that are deemed necessary, including, but not limited to, imaging examinations, blood tests and other laboratory procedures as may be required by the hospital, clinic, or may be ordered by my physician(s) or persons working under the general and/or special instructions of my physician(s).      I understand and agree that this consent covers all authorized persons, including but not limited to physicians, residents, nurse practitioners, physicians' assistants, specialists, consultants, student nurses, and independently contracted physicians, who are called upon by the physician in charge, to carry out the diagnostic procedures and  medical or surgical treatment.     I hereby authorize [facility] to retain or dispose of any specimens or tissue, should there be such remaining from any test or procedure.     I hereby authorize and give consent for [facility] providers and employees to take photographs, images or videotapes of such diagnostic, surgical or treatment procedures of Patient as may be required by [facility] or as may be ordered by a physician. I further acknowledge and agree that [facility] may use cameras or other devices for patient monitoring.     I am aware that the practice of medicine is not an exact science, and I acknowledge that no guarantees have been made to me as to the outcome of any tests, procedures or treatment.     Authorization for Release of Information: I understand that my insurance company and/or their agents may need information necessary to make determinations about payment/reimbursement. I hereby provide authorization to release to all insurance companies, their successors, assignees, other parties with whom they may have contracted, or others acting on their behalf, that are involved with payment for any hospital and/or clinic charges incurred by the patient, any information that they request and deem necessary for payment/reimbursement, and/or quality review.  I further authorize the release of my health information to physicians or other health care practitioners on staff who are involved in my health care now and in the future, and to other health care providers, entities, or institutions for the purpose of my continued care and treatment, including referrals.     REGISTRATION AUTHORIZATION  Form No. 86659 (Rev. 3/25/2024)    Page 1 of 3                       Medicare Patient's Certification and Authorization to Release Information and Payment Request:  I certify that the information given by me in applying for payment under Title XVIII of the Social Security Act is correct. I authorize any hartley of medical or other  information about me to release to the Social [facility], or its intermediaries or carriers, any information needed for this or a related Medicare claim. I request that payment of authorized benefits be made on my behalf.     Assignment of Insurance Benefits:   I hereby authorize any and all insurance companies, health plans, defined   benefit plans, health insurers or any entity that is or may be responsible for payment of my medical expenses to pay all hospital and medical benefits now due, and to become due and payable to me under any hospital benefits, sick benefits, injury benefits or any other benefit for services rendered to me, including Major Medical Benefits, direct to [facility] and all independently contracted physicians. I assign any and all rights that I may have against any and all insurance companies, health plans, defined benefit plans, health insurers or any entity that is or may be responsible for payment of my medical expenses, including, but not limited to any right to appeal a denial of a claim, any right to bring any action, lawsuit, administrative proceeding, or other cause of action on my behalf. I specifically assign my right to pursue litigation against any and all insurance companies, health plans, defined benefit plans, health insurers or any entity that is or may be responsible for payment of my medical expenses based upon a refusal to pay charges.            E. Valuables: It is understood and agreed that [facility] is not liable for the damage to or loss of any money, jewelry,   documents, dentures, eye glasses, hearing aids, prosthetics, or other property of value.     F. Computer Equipment: I understand and agree that should I choose to use computer equipment owned by [facility] or if I choose to access the Internet via [facility]s network, I do so at my own risk. [facility] is not responsible for any damage to my computer equipment or to any damages of any type that might arise from  my loss of equipment or data.     G. Acceptance of Financial Responsibility:  I agree that in consideration of the services and   supplies that have been   or will be furnished to the patient, I am hereby obligated to pay all charges made for or on the account of the patient according to the standard rates (in effect at the time the services and supplies are delivered) established by [facility], including its Patient Financial Assistance Policy to the extent it is applicable. I understand that I am responsible for all charges, or portions thereof, not covered by insurance or other sources. Patient refunds will be distributed only after balances at all [facility] facilities are paid.     H. Communication Authorization:  I hereby authorize [facility] and its representatives, along with any billing service   or  who may work on their behalf, to contact me on   my cell phone and/or home phone using pre- recorded messages, artificial voice messages, automatic telephone dialing devices or other computer assisted technology, or by electronic      mail, text messaging, or by any other form of electronic communication. This includes, but is not limited to, appointment reminders, yearly physical exam reminders, preventive care reminders, patient campaigns, welcome calls, and calls about account balances on my account or any account on which I am listed as a guarantor. I understand I have the right to opt out of these communications at any time.      Relationship  Between  Facility and  Provider:      I understand that some, but not all, providers furnishing services to the patient are not employees or agents of [facility]. The patient is under the care and supervision of his/her attending physician, and it is the responsibility of the facility and its nursing staff to carry out the instructions of such physicians. It is the responsibility of the patient's physician/designee to obtain the patient's informed consent, when  required, for medical or surgical treatment, special diagnostic or therapeutic procedures, or hospital services rendered for the patient under the special instructions of the physician/designee.           REGISTRATION AUTHORIZATION  Form No. 87796 (Rev. 3/25/2024)    Page 2 of 3                       Immunizations: [facility] shares immunization information with state sponsored health departments to help you and your doctor keep track of your immunization records. By signing, you consent to have this information shared with the health department in your state:                                [place] - LINKS ([place] Immunization Network for Kids Statewide)                                [place] - MIIX ([place] Immunization Information eXchange)                                [place] - ImmPRINT (Immunization Patient Registry with Integrated Technology)     TERM: This authorization is valid for this and subsequent care/treatment I receive at [facility] and will remain valid unless/until revoked in writing by me.     [facility]: As used in this document, [facility] means all [facility] owned and managed facilities, including, but not limited to, all health centers, surgery centers, clinics, urgent care centers, and hospitals.         [facility] complies with applicable Federal civil rights laws and does not discriminate on the basis of race, color, national origin, age, disability, or sex.  ATENCIÓN: si habla español, tiene a fernandes disposición servicios gratuitos de asistencia lingüística. Clement garcia [phone].  CHÚ Ý: N?u b?n nói Ti?ng Vi?t, có các d?ch v? h? tr? ngôn ng? mi?n phí dành cho b?n. G?i s? [phone].        REGISTRATION AUTHORIZATION  Form No. 96113 (Rev. 3/25/2024)   Page 3 of 3     Patient

## 2025-05-03 LAB
ALDOST SERPL-MCNC: 9.6 NG/DL
ALDOST/RENIN PLAS-RTO: 96.2 RATIO
RENIN PLAS-CCNC: 0.1 NG/ML/HR

## 2025-05-09 ENCOUNTER — TELEPHONE (OUTPATIENT)
Dept: FAMILY MEDICINE | Facility: CLINIC | Age: 55
End: 2025-05-09
Payer: COMMERCIAL

## 2025-05-09 ENCOUNTER — TELEPHONE (OUTPATIENT)
Dept: PRIMARY CARE CLINIC | Facility: CLINIC | Age: 55
End: 2025-05-09
Payer: COMMERCIAL

## 2025-05-09 ENCOUNTER — RESULTS FOLLOW-UP (OUTPATIENT)
Dept: FAMILY MEDICINE | Facility: CLINIC | Age: 55
End: 2025-05-09

## 2025-05-09 DIAGNOSIS — I25.10 CORONARY ARTERY DISEASE INVOLVING NATIVE CORONARY ARTERY OF NATIVE HEART WITHOUT ANGINA PECTORIS: ICD-10-CM

## 2025-05-09 DIAGNOSIS — I10 PRIMARY HYPERTENSION: Primary | ICD-10-CM

## 2025-05-09 LAB
W METANEPHRINE, FREE: 35 PG/ML
W NORMETANEPHRINE, FREE: 70 PG/ML
W TOTAL, FREE (MN + NMN): 105 PG/ML

## 2025-05-09 RX ORDER — AMLODIPINE BESYLATE 10 MG/1
10 TABLET ORAL DAILY
COMMUNITY

## 2025-05-09 NOTE — TELEPHONE ENCOUNTER
Can you schedule him with cardiologist in [place]?    I have signed for the following orders AND/OR meds.  Please call the patient and ask the patient to schedule the testing AND/OR inform about any medications that were sent. Medications have been sent to pharmacy listed below      Orders Placed This Encounter   Procedures    Ambulatory referral/consult to Cardiology     Standing Status:   Future     Expected Date:   5/16/2025     Expiration Date:   6/9/2026     Referral Priority:   Routine     Referral Type:   Consultation     Referral Reason:   Specialty Services Required     Requested Specialty:   Cardiology     Number of Visits Requested:   1              [facility] [place] - [address]  [address]  [place] [address]  Phone: [phone] Fax: [phone]

## 2025-05-09 NOTE — PROGRESS NOTES
Results have been released via [employer]. Please verify that these have been viewed by patient. If not, please call patient with results.     I have sent a message to them with the following interpretation (see below).    I have reviewed your recent blood work.     A1C ABNORMAL PREDIABETES-Your A1C was slightly above normal and is considered to be in prediabetes range (5.7-6.4). Diabetes is diagnosed with an A1c of 6.5 and greater. Make sure to work on low carb diet and exercise to prevent progression to diabetes. We will continue to monitor closely.  CBC NORMAL-The CBC appears to be stable at this time with no sign of major anemia, abnormal white count or platelet abnormality.  CMP/BMP NORMAL-The electrolytes all appear stable at this time. This includes kidney functions along with routine electrolytes like sugar, potassium and sodium.  The liver enzymes were noted to be stable also.  TSH NORMAL-The TSH screening indicated a normal function of the thyroid.  Cortisol level, metanephrines and aldosterone/renin activity unremarkable.     Please do not hesitate to call or message with any additional questions or concerns.    Cony Gee PA-C

## 2025-05-09 NOTE — TELEPHONE ENCOUNTER
Please fax external referral to Dr. Purvis.    I have signed for the following orders AND/OR meds.  Please call the patient and ask the patient to schedule the testing AND/OR inform about any medications that were sent. Medications have been sent to pharmacy listed below      Orders Placed This Encounter   Procedures    Ambulatory referral/consult to Cardiology     Standing Status:   Future     Expected Date:   5/16/2025     Expiration Date:   6/9/2026     Referral Priority:   Routine     Referral Type:   Consultation     Referral Reason:   Specialty Services Required     Requested Specialty:   Cardiology     Number of Visits Requested:   1    Ambulatory referral/consult to Cardiology     Standing Status:   Future     Expected Date:   5/16/2025     Expiration Date:   6/9/2026     Referral Priority:   Routine     Referral Type:   Consultation     Referral Reason:   Specialty Services Required     Referred to Provider:   Tray Purvis MD     Requested Specialty:   Cardiology     Number of Visits Requested:   1              [facility] [place] - [address]  [address]  [facility] [address]  Phone: [phone] Fax: [phone]

## 2025-05-09 NOTE — TELEPHONE ENCOUNTER
----- Message from Marleni sent at 5/9/2025  3:51 PM CDT -----  Contact: self  .Type:  Patient Returning CallWho Called:.Micha Greenwood Left Message for Patient: sheilaDoes the patient know what this is regarding?:yesWould the patient rather a call back or a response via [facility]? Call [facility] Call Back Number:.[phone] Information: pt is returning a call.

## 2025-05-12 ENCOUNTER — TELEPHONE (OUTPATIENT)
Dept: CARDIOLOGY | Facility: CLINIC | Age: 55
End: 2025-05-12
Payer: COMMERCIAL

## 2025-05-12 NOTE — TELEPHONE ENCOUNTER
offered the pt with provider today. he states that he can not come today. asked if he wishes me to see if i can get him in with someone else at a later date states no just hold off of it pbr

## 2025-06-02 ENCOUNTER — TELEPHONE (OUTPATIENT)
Dept: FAMILY MEDICINE | Facility: CLINIC | Age: 55
End: 2025-06-02
Payer: COMMERCIAL

## 2025-06-02 DIAGNOSIS — N52.9 ERECTILE DYSFUNCTION, UNSPECIFIED ERECTILE DYSFUNCTION TYPE: Primary | ICD-10-CM

## 2025-06-02 RX ORDER — TADALAFIL 20 MG/1
20 TABLET ORAL DAILY PRN
Qty: 30 TABLET | Refills: 11 | Status: SHIPPED | OUTPATIENT
Start: 2025-06-02 | End: 2026-06-02

## 2025-06-10 ENCOUNTER — PATIENT MESSAGE (OUTPATIENT)
Dept: FAMILY MEDICINE | Facility: CLINIC | Age: 55
End: 2025-06-10
Payer: COMMERCIAL

## 2025-06-11 ENCOUNTER — PATIENT OUTREACH (OUTPATIENT)
Dept: ADMINISTRATIVE | Facility: HOSPITAL | Age: 55
End: 2025-06-11
Payer: COMMERCIAL

## 2025-06-11 NOTE — LETTER
25    AUTHORIZATION FOR RELEASE OF   CONFIDENTIAL INFORMATION    DR DE LA PAZ,    We are seeing Micha Bolton, date of birth 1970, in the clinic at [facility]. Vira Aguila MD is the patient's PCP. Micha Bolton has an outstanding lab/procedure at the time we reviewed his chart. In order to help keep his health information updated, he has authorized us to request the following medical record(s):       PLEASE RE-FAX THE 25 COLONOSCOPY W/ PATHOLOGY REPORT & FOLLOW UP DATE.   WE ONLY RECEIVED THE LAST PAGE OF THE 25 COLONOSCOPY REPORT    PATHOLOGY REPORT FOR 23 COLONOSCOPY.       Please fax records to [facility], Watts, Kacie S., MD,  at [phone] or email to [email or web address].                 Micha Bolton  MRN: [id number]  : 1970  Age: 53 y.o.  Sex: male         Patient/Legal Guardian Signature  This signature was collected at 2024           _______________________________   Printed Name/Relationship to Patient      Consent for Examination and Treatment: I hereby authorize the providers and employees of [facility] ([facility]) to provide medical treatment/services which includes, but is not limited to, performing and administering tests and diagnostic procedures that are deemed necessary, including, but not limited to, imaging examinations, blood tests and other laboratory procedures as may be required by the hospital, clinic, or may be ordered by my physician(s) or persons working under the general and/or special instructions of my physician(s).      I understand and agree that this consent covers all authorized persons, including but not limited to physicians, residents, nurse practitioners, physicians' assistants, specialists, consultants, student nurses, and independently contracted physicians, who are called upon by the physician in charge, to carry out the diagnostic procedures and medical or surgical treatment.     I hereby  authorize [facility] to retain or dispose of any specimens or tissue, should there be such remaining from any test or procedure.     I hereby authorize and give consent for [facility] providers and employees to take photographs, images or videotapes of such diagnostic, surgical or treatment procedures of Patient as may be required by [facility] or as may be ordered by a physician. I further acknowledge and agree that [facility] may use cameras or other devices for patient monitoring.     I am aware that the practice of medicine is not an exact science, and I acknowledge that no guarantees have been made to me as to the outcome of any tests, procedures or treatment.     Authorization for Release of Information: I understand that my insurance company and/or their agents may need information necessary to make determinations about payment/reimbursement. I hereby provide authorization to release to all insurance companies, their successors, assignees, other parties with whom they may have contracted, or others acting on their behalf, that are involved with payment for any hospital and/or clinic charges incurred by the patient, any information that they request and deem necessary for payment/reimbursement, and/or quality review.  I further authorize the release of my health information to physicians or other health care practitioners on staff who are involved in my health care now and in the future, and to other health care providers, entities, or institutions for the purpose of my continued care and treatment, including referrals.     REGISTRATION AUTHORIZATION  Form No. 16537 (Rev. 3/25/2024)    Page 1 of 3                  Patient Name: Micha Bolton  : 1970  Patient Phone #: [phone]

## 2025-06-11 NOTE — PROGRESS NOTES
REQUEST SENT ASKING FOR THE 4/17/25 TO BE RE-FAXED. REMINDER SET. WE ONLY RECEIVED ONE PAGE OF THE REPORT.  8/29/23 & 2/8/24 COLONOSCOPIES HYPERLINKED.

## 2025-06-18 ENCOUNTER — TELEPHONE (OUTPATIENT)
Dept: PRIMARY CARE CLINIC | Facility: CLINIC | Age: 55
End: 2025-06-18
Payer: COMMERCIAL

## 2025-06-18 ENCOUNTER — TELEPHONE (OUTPATIENT)
Dept: FAMILY MEDICINE | Facility: CLINIC | Age: 55
End: 2025-06-18
Payer: COMMERCIAL

## 2025-06-18 DIAGNOSIS — I25.10 CORONARY ARTERY DISEASE INVOLVING NATIVE CORONARY ARTERY OF NATIVE HEART WITHOUT ANGINA PECTORIS: ICD-10-CM

## 2025-06-18 DIAGNOSIS — E66.811 CLASS 1 OBESITY WITHOUT SERIOUS COMORBIDITY WITH BODY MASS INDEX (BMI) OF 33.0 TO 33.9 IN ADULT, UNSPECIFIED OBESITY TYPE: ICD-10-CM

## 2025-06-18 DIAGNOSIS — I10 PRIMARY HYPERTENSION: Primary | ICD-10-CM

## 2025-06-18 DIAGNOSIS — R73.03 PREDIABETES: ICD-10-CM

## 2025-06-18 RX ORDER — AMLODIPINE BESYLATE 10 MG/1
10 TABLET ORAL DAILY
Qty: 90 TABLET | Refills: 3 | Status: SHIPPED | OUTPATIENT
Start: 2025-06-18

## 2025-06-18 NOTE — TELEPHONE ENCOUNTER
I have signed for the following orders AND/OR meds.  Please call the patient and ask the patient to schedule the testing AND/OR inform about any medications that were sent. Medications have been sent to pharmacy listed below      No orders of the defined types were placed in this encounter.      Medications Ordered This Encounter   Medications    amLODIPine (NORVASC) 10 MG tablet     Sig: Take 1 tablet (10 mg total) by mouth once daily.     Dispense:  90 tablet     Refill:  3     .    tirzepatide 7.5 mg/0.5 mL PnIj     Sig: Inject 7.5 mg into the skin every 7 days.     Dispense:  6 mL     Refill:  3         [place] - [address] [address]  [address] [address] [address]  Phone: [phone] Fax: [phone]

## 2025-06-18 NOTE — TELEPHONE ENCOUNTER
Patient needs to see me to be considered on my panel and needs an updated BP reading so please schedule an appt. He can do this virtually but has to have an updated BP reading for the appt.

## 2025-07-01 ENCOUNTER — OFFICE VISIT (OUTPATIENT)
Dept: PRIMARY CARE CLINIC | Facility: CLINIC | Age: 55
End: 2025-07-01
Payer: COMMERCIAL

## 2025-07-01 VITALS — DIASTOLIC BLOOD PRESSURE: 81 MMHG | SYSTOLIC BLOOD PRESSURE: 124 MMHG | HEART RATE: 77 BPM

## 2025-07-01 DIAGNOSIS — I25.10 CORONARY ARTERY DISEASE INVOLVING NATIVE CORONARY ARTERY OF NATIVE HEART WITHOUT ANGINA PECTORIS: ICD-10-CM

## 2025-07-01 DIAGNOSIS — I10 PRIMARY HYPERTENSION: Primary | ICD-10-CM

## 2025-07-01 DIAGNOSIS — E78.2 MIXED HYPERLIPIDEMIA: ICD-10-CM

## 2025-07-01 PROCEDURE — 3074F SYST BP LT 130 MM HG: CPT | Mod: CPTII,95,, | Performed by: INTERNAL MEDICINE

## 2025-07-01 PROCEDURE — 3044F HG A1C LEVEL LT 7.0%: CPT | Mod: CPTII,95,, | Performed by: INTERNAL MEDICINE

## 2025-07-01 PROCEDURE — 3079F DIAST BP 80-89 MM HG: CPT | Mod: CPTII,95,, | Performed by: INTERNAL MEDICINE

## 2025-07-01 PROCEDURE — 1159F MED LIST DOCD IN RCRD: CPT | Mod: CPTII,95,, | Performed by: INTERNAL MEDICINE

## 2025-07-01 PROCEDURE — G2211 COMPLEX E/M VISIT ADD ON: HCPCS | Mod: 95,,, | Performed by: INTERNAL MEDICINE

## 2025-07-01 PROCEDURE — 98006 SYNCH AUDIO-VIDEO EST MOD 30: CPT | Mod: 95,,, | Performed by: INTERNAL MEDICINE

## 2025-07-01 PROCEDURE — 4010F ACE/ARB THERAPY RXD/TAKEN: CPT | Mod: CPTII,95,, | Performed by: INTERNAL MEDICINE

## 2025-07-01 RX ORDER — VALSARTAN 320 MG/1
320 TABLET ORAL DAILY
COMMUNITY

## 2025-07-01 RX ORDER — CARVEDILOL 25 MG/1
25 TABLET ORAL 2 TIMES DAILY WITH MEALS
COMMUNITY
Start: 2025-06-09 | End: 2026-06-09

## 2025-07-01 RX ORDER — METFORMIN HYDROCHLORIDE 500 MG/1
500 TABLET ORAL 2 TIMES DAILY
COMMUNITY

## 2025-07-08 NOTE — PROGRESS NOTES
Primary Care Telemedicine Note  The patient location is:  Patient Home   The chief complaint leading to consultation is: BP f/u  Total time spent with patient: 10 min    Visit type: Virtual visit with synchronous audio and video  Each patient to whom he or she provides medical services by telemedicine is:  (1) informed of the relationship between the physician and patient and the respective role of any other health care provider with respect to management of the patient; and (2) notified that he or she may decline to receive medical services by telemedicine and may withdraw from such care at any time.    Assessment/Plan:    1. Primary hypertension  Overview:  Hypertension Medications              amLODIPine (NORVASC) 10 MG tablet Take 1 tablet (10 mg total) by mouth once daily.    carvediloL (COREG) 25 MG tablet Take 25 mg by mouth 2 (two) times daily with meals.    valsartan (DIOVAN) 320 MG tablet Take 320 mg by mouth once daily.     -at goal today  -continue lifestyle modification with low sodium diet and exercise   -discussed hypertension disease course and importance of treating high blood pressure  -patient understood and advised of risk of untreated blood pressure. ER precautions were given for symptoms of hypertensive urgency and emergency.        2. Coronary artery disease involving native coronary artery of native heart without angina pectoris  Overview:  -[facility] 3/3/25-very mild nonobstructive coronary artery disease and normal left ventricular function  -remains on ASA and statin  -denies any current chest pain or shortness of breath      3. Mixed hyperlipidemia  Overview:  Hyperlipidemia Medications              rosuvastatin (CRESTOR) 10 MG tablet Take 1 tablet (10 mg total) by mouth once daily.          -chronic condition. Currently stable.    -reports compliance with hyperlipidemia treatment as prescribed  -denies any known adverse effects of medications  -most recent labs listed below:  Lab Results    Component Value Date    CHOL 113 03/03/2025     Lab Results   Component Value Date    HDL 29 03/03/2025     Lab Results   Component Value Date    LDLCALC 68 03/03/2025     Lab Results   Component Value Date    TRIG 79 03/03/2025     Lab Results   Component Value Date    ALT 24 04/30/2025    AST 21 04/30/2025    ALKPHOS 54 04/30/2025    BILITOT 0.7 04/30/2025             Visit today included increased complexity associated with the care of the episodic problem(s) addressed and managing the longitudinal care of the patient due to the serious and/or complex managed problem(s). See above assessment/plan.      Vira Aguila MD  _____________________________________________________________________________________________________________________________________________________    HPI:    Patient is an established patient who presents today via virtual visit.    History of Present Illness    CHIEF COMPLAINT:  Patient presents today for follow up of blood pressure.    HYPERTENSION:  He reports improved blood pressure control over the past 6 months. He takes amlodipine 10 mg, carvedilol 25 mg, and valsartan 320 mg nightly for management.    MEDICAL HISTORY:  History of colon cancer status post resection over a year ago with two clear follow-up evaluations. He has pre-diabetes.    LOW TESTOSTERONE:  He reports decreased drive and energy with significant fatigue requiring daily naps. He describes feeling unusually lethargic compared to his baseline. He is not currently receiving treatment for this condition.    MEDICATIONS:  He continues Mounjaro 7.5 mg with mild side effects of occasional gas. He reports tolerating the medication well overall and has lost approximately 12 lbs since starting.          No other new complaints today.      Past Medical History:  Past Medical History:   Diagnosis Date    General anesthetics causing adverse effect in therapeutic use     hard to arouse    Hypertension     Primary localized  osteoarthrosis of left shoulder     Sleep apnea        Medications Ordered Prior to Encounter[1]    Review of Systems   Constitutional:  Negative for chills, fever and malaise/fatigue.   HENT:  Negative for hearing loss.    Eyes:  Negative for discharge.   Respiratory:  Negative for cough, shortness of breath and wheezing.    Cardiovascular:  Negative for chest pain, palpitations and leg swelling.   Gastrointestinal:  Negative for abdominal pain, blood in stool, constipation, diarrhea and vomiting.   Genitourinary:  Negative for dysuria, frequency, hematuria and urgency.   Musculoskeletal:  Negative for back pain, joint pain and neck pain.   Neurological:  Negative for weakness and headaches.   Endo/Heme/Allergies:  Negative for polydipsia.   Psychiatric/Behavioral:  Negative for depression. The patient is not nervous/anxious.          Physical Exam   Vitals:    07/01/25 0731   BP: 124/81   Pulse: 77      .FLOWAMB[14   BMI Readings from Last 1 Encounters:   04/30/25 32.75 kg/m²       Physical Exam  Constitutional:       General: He is not in acute distress.     Appearance: He is well-developed.   HENT:      Head: Normocephalic and atraumatic.   Pulmonary:      Effort: Pulmonary effort is normal. No respiratory distress.   Abdominal:      General: There is no distension.   Musculoskeletal:         General: Normal range of motion.      Cervical back: Normal range of motion.   Neurological:      Mental Status: He is alert and oriented to person, place, and time.   Psychiatric:         Mood and Affect: Mood normal.         Behavior: Behavior normal.         This note was generated with the assistance of ambient listening technology. Verbal consent was obtained by the patient and accompanying visitor(s) for the recording of patient appointment to facilitate this note. I attest to having reviewed and edited the generated note for accuracy, though some syntax or spelling errors may persist. Please contact the author of  this note for any clarification.      Answers submitted by the patient for this visit:  Review of Systems Questionnaire (Submitted on 7/1/2025)  activity change: No  unexpected weight change: No  rhinorrhea: No  trouble swallowing: No  visual disturbance: No  chest tightness: No  polyuria: No  difficulty urinating: No  joint swelling: No  arthralgias: No  confusion: No  dysphoric mood: No         [1]   Current Outpatient Medications on File Prior to Visit   Medication Sig Dispense Refill    amLODIPine (NORVASC) 10 MG tablet Take 1 tablet (10 mg total) by mouth once daily. 90 tablet 3    carvediloL (COREG) 25 MG tablet Take 25 mg by mouth 2 (two) times daily with meals.      metFORMIN (GLUCOPHAGE) 500 MG tablet Take 500 mg by mouth 2 (two) times daily.      pantoprazole (PROTONIX) 40 MG tablet Take 1 tablet (40 mg total) by mouth once daily. 90 tablet 3    rosuvastatin (CRESTOR) 10 MG tablet Take 1 tablet (10 mg total) by mouth once daily. 90 tablet 3    sildenafiL (VIAGRA) 100 MG tablet Take 1 tablet (100 mg total) by mouth once daily. 30 tablet 11    tadalafiL (CIALIS) 20 MG Tab Take 1 tablet (20 mg total) by mouth daily as needed (Take 30 minutes prior to sexual activity.). 30 tablet 11    tirzepatide 7.5 mg/0.5 mL PnIj Inject 7.5 mg into the skin every 7 days. 6 mL 3    valsartan (DIOVAN) 320 MG tablet Take 320 mg by mouth once daily.       Current Facility-Administered Medications on File Prior to Visit   Medication Dose Route Frequency Provider Last Rate Last Admin    HYDROmorphone injection 0.5 mg  0.5 mg Intravenous Q5 Min PRN Volpi-Abadie, Jacqueline, MD        lactated ringers infusion   Intravenous Continuous Volpi-Abadie, Jacqueline, MD   Stopped at 10/12/23 1304    LIDOcaine (PF) 10 mg/ml (1%) injection 1 mg  0.1 mL Intradermal Once Volpi-Abadie, Jacqueline, MD        LORazepam injection 0.25 mg  0.25 mg Intravenous Q15 Min PRN Volpi-Abadie, Jacqueline, MD        oxyCODONE-acetaminophen 5-325 mg per  tablet 1 tablet  1 tablet Oral Q3H PRN Volpi-Abadie, Jacqueline, MD        sodium chloride 0.9% flush 3 mL  3 mL Intravenous PRN Volpi-Abadie, Jacqueline, MD

## 2025-07-17 ENCOUNTER — PATIENT OUTREACH (OUTPATIENT)
Dept: ADMINISTRATIVE | Facility: HOSPITAL | Age: 55
End: 2025-07-17
Payer: COMMERCIAL

## 2025-07-17 NOTE — PROGRESS NOTES
4/17/25 colonoscopy hyperlinked.  8/29/23 colon path report hyperlinked to 8/29/23 colon report  2/8/24 colon path report hyperlinked to 2/8/24 colon report

## 2025-08-06 ENCOUNTER — LAB VISIT (OUTPATIENT)
Dept: LAB | Facility: HOSPITAL | Age: 55
End: 2025-08-06
Attending: PHYSICIAN ASSISTANT
Payer: COMMERCIAL

## 2025-08-06 ENCOUNTER — OFFICE VISIT (OUTPATIENT)
Dept: FAMILY MEDICINE | Facility: CLINIC | Age: 55
End: 2025-08-06
Payer: COMMERCIAL

## 2025-08-06 VITALS
WEIGHT: 222.38 LBS | HEART RATE: 85 BPM | RESPIRATION RATE: 18 BRPM | DIASTOLIC BLOOD PRESSURE: 84 MMHG | SYSTOLIC BLOOD PRESSURE: 128 MMHG | BODY MASS INDEX: 31.13 KG/M2 | HEIGHT: 71 IN

## 2025-08-06 DIAGNOSIS — Z12.5 SCREENING PSA (PROSTATE SPECIFIC ANTIGEN): ICD-10-CM

## 2025-08-06 DIAGNOSIS — Z00.00 ENCOUNTER FOR ANNUAL HEALTH EXAMINATION: ICD-10-CM

## 2025-08-06 DIAGNOSIS — I25.10 CORONARY ARTERY DISEASE INVOLVING NATIVE CORONARY ARTERY OF NATIVE HEART WITHOUT ANGINA PECTORIS: ICD-10-CM

## 2025-08-06 DIAGNOSIS — Z85.038 HISTORY OF COLON CANCER: ICD-10-CM

## 2025-08-06 DIAGNOSIS — E34.9 HYPOTESTOSTERONEMIA: ICD-10-CM

## 2025-08-06 DIAGNOSIS — K21.9 GASTROESOPHAGEAL REFLUX DISEASE, UNSPECIFIED WHETHER ESOPHAGITIS PRESENT: ICD-10-CM

## 2025-08-06 DIAGNOSIS — Z00.00 ENCOUNTER FOR ANNUAL HEALTH EXAMINATION: Primary | ICD-10-CM

## 2025-08-06 DIAGNOSIS — M25.512 LEFT SHOULDER PAIN, UNSPECIFIED CHRONICITY: ICD-10-CM

## 2025-08-06 DIAGNOSIS — E78.5 HYPERLIPIDEMIA, UNSPECIFIED HYPERLIPIDEMIA TYPE: ICD-10-CM

## 2025-08-06 DIAGNOSIS — E11.9 TYPE 2 DIABETES MELLITUS WITHOUT COMPLICATION, WITHOUT LONG-TERM CURRENT USE OF INSULIN: ICD-10-CM

## 2025-08-06 DIAGNOSIS — I10 PRIMARY HYPERTENSION: ICD-10-CM

## 2025-08-06 DIAGNOSIS — E66.811 CLASS 1 OBESITY DUE TO EXCESS CALORIES WITHOUT SERIOUS COMORBIDITY WITH BODY MASS INDEX (BMI) OF 31.0 TO 31.9 IN ADULT: ICD-10-CM

## 2025-08-06 DIAGNOSIS — E66.09 CLASS 1 OBESITY DUE TO EXCESS CALORIES WITHOUT SERIOUS COMORBIDITY WITH BODY MASS INDEX (BMI) OF 31.0 TO 31.9 IN ADULT: ICD-10-CM

## 2025-08-06 LAB
ABSOLUTE EOSINOPHIL (OHS): 0.21 K/UL
ABSOLUTE MONOCYTE (OHS): 0.56 K/UL (ref 0.3–1)
ABSOLUTE NEUTROPHIL COUNT (OHS): 2.37 K/UL (ref 1.8–7.7)
ALBUMIN SERPL BCP-MCNC: 4 G/DL (ref 3.5–5.2)
ALBUMIN/CREAT UR: 6.6 UG/MG
ALP SERPL-CCNC: 56 UNIT/L (ref 40–150)
ALT SERPL W/O P-5'-P-CCNC: 13 UNIT/L (ref 0–55)
ANION GAP (OHS): 9 MMOL/L (ref 8–16)
AST SERPL-CCNC: 23 UNIT/L (ref 0–50)
BASOPHILS # BLD AUTO: 0.06 K/UL
BASOPHILS NFR BLD AUTO: 1.1 %
BILIRUB SERPL-MCNC: 0.6 MG/DL (ref 0.1–1)
BUN SERPL-MCNC: 15 MG/DL (ref 6–20)
CALCIUM SERPL-MCNC: 9.3 MG/DL (ref 8.7–10.5)
CHLORIDE SERPL-SCNC: 104 MMOL/L (ref 95–110)
CHOLEST SERPL-MCNC: 120 MG/DL (ref 120–199)
CHOLEST/HDLC SERPL: 3.9 {RATIO} (ref 2–5)
CO2 SERPL-SCNC: 26 MMOL/L (ref 23–29)
CREAT SERPL-MCNC: 0.9 MG/DL (ref 0.5–1.4)
CREAT UR-MCNC: 243 MG/DL (ref 23–375)
EAG (OHS): 103 MG/DL (ref 68–131)
ERYTHROCYTE [DISTWIDTH] IN BLOOD BY AUTOMATED COUNT: 12.5 % (ref 11.5–14.5)
GFR SERPLBLD CREATININE-BSD FMLA CKD-EPI: >60 ML/MIN/1.73/M2
GLUCOSE SERPL-MCNC: 104 MG/DL (ref 70–110)
HBA1C MFR BLD: 5.2 % (ref 4–5.6)
HCT VFR BLD AUTO: 42.8 % (ref 40–54)
HDLC SERPL-MCNC: 31 MG/DL (ref 40–75)
HDLC SERPL: 25.8 % (ref 20–50)
HGB BLD-MCNC: 14.3 GM/DL (ref 14–18)
IMM GRANULOCYTES # BLD AUTO: 0.01 K/UL (ref 0–0.04)
IMM GRANULOCYTES NFR BLD AUTO: 0.2 % (ref 0–0.5)
LDLC SERPL CALC-MCNC: 74.8 MG/DL (ref 63–159)
LYMPHOCYTES # BLD AUTO: 2.11 K/UL (ref 1–4.8)
MCH RBC QN AUTO: 30.3 PG (ref 27–31)
MCHC RBC AUTO-ENTMCNC: 33.4 G/DL (ref 32–36)
MCV RBC AUTO: 91 FL (ref 82–98)
MICROALBUMIN UR-MCNC: 16 UG/ML (ref ?–5000)
NONHDLC SERPL-MCNC: 89 MG/DL
NUCLEATED RBC (/100WBC) (OHS): 0 /100 WBC
PLATELET # BLD AUTO: 351 K/UL (ref 150–450)
PMV BLD AUTO: 10.5 FL (ref 9.2–12.9)
POTASSIUM SERPL-SCNC: 3.5 MMOL/L (ref 3.5–5.1)
PROT SERPL-MCNC: 7.2 GM/DL (ref 6–8.4)
PSA SERPL-MCNC: 0.45 NG/ML
RBC # BLD AUTO: 4.72 M/UL (ref 4.6–6.2)
RELATIVE EOSINOPHIL (OHS): 3.9 %
RELATIVE LYMPHOCYTE (OHS): 39.7 % (ref 18–48)
RELATIVE MONOCYTE (OHS): 10.5 % (ref 4–15)
RELATIVE NEUTROPHIL (OHS): 44.6 % (ref 38–73)
SODIUM SERPL-SCNC: 139 MMOL/L (ref 136–145)
TESTOST SERPL-MCNC: 328 NG/DL (ref 304–1227)
TRIGL SERPL-MCNC: 71 MG/DL (ref 30–150)
WBC # BLD AUTO: 5.32 K/UL (ref 3.9–12.7)

## 2025-08-06 PROCEDURE — 82040 ASSAY OF SERUM ALBUMIN: CPT

## 2025-08-06 PROCEDURE — 3008F BODY MASS INDEX DOCD: CPT | Mod: CPTII,S$GLB,, | Performed by: PHYSICIAN ASSISTANT

## 2025-08-06 PROCEDURE — 1159F MED LIST DOCD IN RCRD: CPT | Mod: CPTII,S$GLB,, | Performed by: PHYSICIAN ASSISTANT

## 2025-08-06 PROCEDURE — 85025 COMPLETE CBC W/AUTO DIFF WBC: CPT

## 2025-08-06 PROCEDURE — 3074F SYST BP LT 130 MM HG: CPT | Mod: CPTII,S$GLB,, | Performed by: PHYSICIAN ASSISTANT

## 2025-08-06 PROCEDURE — 84153 ASSAY OF PSA TOTAL: CPT

## 2025-08-06 PROCEDURE — 1160F RVW MEDS BY RX/DR IN RCRD: CPT | Mod: CPTII,S$GLB,, | Performed by: PHYSICIAN ASSISTANT

## 2025-08-06 PROCEDURE — 36415 COLL VENOUS BLD VENIPUNCTURE: CPT | Mod: PO

## 2025-08-06 PROCEDURE — 3079F DIAST BP 80-89 MM HG: CPT | Mod: CPTII,S$GLB,, | Performed by: PHYSICIAN ASSISTANT

## 2025-08-06 PROCEDURE — 83036 HEMOGLOBIN GLYCOSYLATED A1C: CPT

## 2025-08-06 PROCEDURE — 82570 ASSAY OF URINE CREATININE: CPT | Performed by: PHYSICIAN ASSISTANT

## 2025-08-06 PROCEDURE — 80061 LIPID PANEL: CPT

## 2025-08-06 PROCEDURE — 99999 PR PBB SHADOW E&M-EST. PATIENT-LVL V: CPT | Mod: PBBFAC,,, | Performed by: PHYSICIAN ASSISTANT

## 2025-08-06 PROCEDURE — 4010F ACE/ARB THERAPY RXD/TAKEN: CPT | Mod: CPTII,S$GLB,, | Performed by: PHYSICIAN ASSISTANT

## 2025-08-06 PROCEDURE — 99396 PREV VISIT EST AGE 40-64: CPT | Mod: S$GLB,,, | Performed by: PHYSICIAN ASSISTANT

## 2025-08-06 PROCEDURE — 84403 ASSAY OF TOTAL TESTOSTERONE: CPT

## 2025-08-06 PROCEDURE — 3044F HG A1C LEVEL LT 7.0%: CPT | Mod: CPTII,S$GLB,, | Performed by: PHYSICIAN ASSISTANT

## 2025-08-06 RX ORDER — HYDROCODONE BITARTRATE AND ACETAMINOPHEN 10; 325 MG/1; MG/1
1 TABLET ORAL EVERY 6 HOURS PRN
Qty: 20 TABLET | Refills: 0 | Status: SHIPPED | OUTPATIENT
Start: 2025-08-06

## 2025-08-06 RX ORDER — PANTOPRAZOLE SODIUM 40 MG/1
40 TABLET, DELAYED RELEASE ORAL DAILY
Qty: 90 TABLET | Refills: 3 | Status: SHIPPED | OUTPATIENT
Start: 2025-08-06 | End: 2026-08-06

## 2025-08-06 RX ORDER — ROSUVASTATIN CALCIUM 10 MG/1
10 TABLET, COATED ORAL DAILY
Qty: 90 TABLET | Refills: 3 | Status: SHIPPED | OUTPATIENT
Start: 2025-08-06 | End: 2026-08-06

## 2025-08-06 RX ORDER — MELOXICAM 15 MG/1
15 TABLET ORAL DAILY
Qty: 30 TABLET | Refills: 0 | Status: SHIPPED | OUTPATIENT
Start: 2025-08-06

## 2025-08-06 RX ORDER — TIZANIDINE 4 MG/1
4 TABLET ORAL EVERY 8 HOURS PRN
Qty: 30 TABLET | Refills: 0 | Status: SHIPPED | OUTPATIENT
Start: 2025-08-06 | End: 2025-08-16

## 2025-08-06 RX ORDER — AMLODIPINE BESYLATE 10 MG/1
10 TABLET ORAL DAILY
Qty: 90 TABLET | Refills: 3 | Status: SHIPPED | OUTPATIENT
Start: 2025-08-06

## 2025-08-06 RX ORDER — VALSARTAN 320 MG/1
320 TABLET ORAL DAILY
Qty: 90 TABLET | Refills: 3 | Status: SHIPPED | OUTPATIENT
Start: 2025-08-06

## 2025-08-06 RX ORDER — CARVEDILOL 25 MG/1
25 TABLET ORAL 2 TIMES DAILY WITH MEALS
Qty: 180 TABLET | Refills: 3 | Status: SHIPPED | OUTPATIENT
Start: 2025-08-06 | End: 2026-08-06

## 2025-08-06 RX ORDER — ASPIRIN 81 MG/1
81 TABLET ORAL DAILY
COMMUNITY

## 2025-08-06 NOTE — PROGRESS NOTES
This note is specifically for wellness visit performed today.         Assessment / Plan:       Patient here for annual wellness exam. Health maintenance was reviewed and ordered.     Complete history and physical was completed today. Complete and thorough medication reconciliation was performed. Discussed risks and benefits of medications. Advised patient on orders and health maintenance. Continue current medications listed on your summary sheet.     All questions were answered. Patient had no further concerns. Advised of diagnoses and plan.     1. Encounter for annual health examination  -     CBC Auto Differential; Future; Expected date: 08/06/2025  -     Comprehensive Metabolic Panel; Future; Expected date: 08/06/2025    2. Coronary artery disease involving native coronary artery of native heart without angina pectoris  Overview:  -[facility] 3/3/25-very mild nonobstructive coronary artery disease and normal left ventricular function  -remains on ASA and statin  -denies any current chest pain or shortness of breath      3. Primary hypertension  Overview:  Hypertension Medications              amLODIPine (NORVASC) 10 MG tablet Take 1 tablet (10 mg total) by mouth once daily.    carvediloL (COREG) 25 MG tablet Take 25 mg by mouth 2 (two) times daily with meals.    valsartan (DIOVAN) 320 MG tablet Take 320 mg by mouth once daily.     -at goal today  -continue lifestyle modification with low sodium diet and exercise   -discussed hypertension disease course and importance of treating high blood pressure  -patient understood and advised of risk of untreated blood pressure. ER precautions were given for symptoms of hypertensive urgency and emergency.      Orders:  -     amLODIPine (NORVASC) 10 MG tablet; Take 1 tablet (10 mg total) by mouth once daily.  Dispense: 90 tablet; Refill: 3  -     carvediloL (COREG) 25 MG tablet; Take 1 tablet (25 mg total) by mouth 2 (two) times daily with meals.  Dispense: 180 tablet; Refill: 3  -      valsartan (DIOVAN) 320 MG tablet; Take 1 tablet (320 mg total) by mouth once daily.  Dispense: 90 tablet; Refill: 3  -     CBC Auto Differential; Future; Expected date: 08/06/2025  -     Comprehensive Metabolic Panel; Future; Expected date: 08/06/2025    4. Hyperlipidemia, unspecified hyperlipidemia type  Overview:  Hyperlipidemia Medications              rosuvastatin (CRESTOR) 10 MG tablet Take 1 tablet (10 mg total) by mouth once daily.          -chronic condition. Currently stable.    -reports compliance with hyperlipidemia treatment as prescribed  -denies any known adverse effects of medications  -most recent labs listed below:  Lab Results   Component Value Date    CHOL 113 03/03/2025     Lab Results   Component Value Date    HDL 29 03/03/2025     Lab Results   Component Value Date    LDLCALC 68 03/03/2025     Lab Results   Component Value Date    TRIG 79 03/03/2025     Lab Results   Component Value Date    ALT 24 04/30/2025    AST 21 04/30/2025    ALKPHOS 54 04/30/2025    BILITOT 0.7 04/30/2025         Orders:  -     rosuvastatin (CRESTOR) 10 MG tablet; Take 1 tablet (10 mg total) by mouth once daily.  Dispense: 90 tablet; Refill: 3  -     Lipid Panel; Future; Expected date: 08/06/2025    5. History of colon cancer  Overview:  -mass of sigmoid colon found on c-scope in 1/2024  -s/p excision of mass at that time   -completed repeat c-scope in 4/2025 which was normal per patient report  -plans for yearly surveillence  -followed closely by GI (Dr. Smallwood)      6. Class 1 obesity due to excess calories without serious comorbidity with body mass index (BMI) of 31.0 to 31.9 in adult  Overview:  General weight loss/lifestyle modification strategies discussed (elicit support from others; identify saboteurs; non-food rewards, etc).  Informal exercise measures discussed, e.g. taking stairs instead of elevator.  Regular aerobic exercise program discussed.  Wt Readings from Last 3 Encounters:   08/06/25 0818 100.9 kg  (222 lb 6.4 oz)   04/30/25 0800 106.5 kg (234 lb 12.8 oz)   11/11/24 1213 102.1 kg (225 lb)         7. Hypotestosteronemia  Overview:  -hx of low testosterone  -has been on injections in the past  -previously followed by urology    Orders:  -     Testosterone,Total; Future; Expected date: 08/06/2025    8. Type 2 diabetes mellitus without complication, without long-term current use of insulin  Overview:  Diabetes Medications              tirzepatide 7.5 mg/0.5 mL PnIj Inject 7.5 mg into the skin every 7 days.          -condition is currently controlled  -currently remains on Metformin 500 mg QD and Mounjaro 7.5 mg weekly  -stopping Metformin per patient request  -also repeating an A1c   -see diabetic health maintenance listed below  -on statin: Yes  -on ACE-I/ARB: Yes  -counseling provided on importance of diabetic diet and medication compliance in order to treat diabetes  -discussed diabetes disease course and potential complications    Orders:  -     tirzepatide 7.5 mg/0.5 mL PnIj; Inject 7.5 mg into the skin every 7 days.  Dispense: 2 mL; Refill: 5  -     Hemoglobin A1C; Future; Expected date: 08/06/2025  -     Microalbumin/Creatinine Ratio, Urine    9. Gastroesophageal reflux disease, unspecified whether esophagitis present  Overview:  -symptoms controlled with daily PPI  -denies alarm symptoms, such as dysphagia, weight loss or N/V  -continue lifestyle modification with avoidance of acidic foods, caffeine, late night eating    Orders:  -     pantoprazole (PROTONIX) 40 MG tablet; Take 1 tablet (40 mg total) by mouth once daily.  Dispense: 90 tablet; Refill: 3    10. Screening PSA (prostate specific antigen)  -     PSA, Screening; Future; Expected date: 08/06/2025    11. Left shoulder pain, unspecified chronicity  Overview:  -L shoulder pain >4 days ago  -no alarm symptoms or signs present on exam  -continue conservative treatment, rest, ice/heat applications, PRN anti-inflammatory medication  -will Rx short  course of PRN Norco for severe pain only. The patient was checked in the [facility] Board of Pharmacy's Prescription Monitoring Program. There appears to be no incongruities with the patient's verbalized history.   -consider PT and/or ortho referral if symptoms persist  -ER precautions for severe or worsening of symptoms    Orders:  -     meloxicam (MOBIC) 15 MG tablet; Take 1 tablet (15 mg total) by mouth once daily.  Dispense: 30 tablet; Refill: 0  -     tiZANidine (ZANAFLEX) 4 MG tablet; Take 1 tablet (4 mg total) by mouth every 8 (eight) hours as needed (muscle pain).  Dispense: 30 tablet; Refill: 0  -     HYDROcodone-acetaminophen (NORCO)  mg per tablet; Take 1 tablet by mouth every 6 (six) hours as needed for Pain.  Dispense: 20 tablet; Refill: 0      Follow up in about 6 months (around 2/6/2026), or if symptoms worsen or fail to improve.    Cony Gee PA-C      WELLNESS EXAM      Patient ID: Micha Bolton is a 54 y.o. male.  has a past medical history of General anesthetics causing adverse effect in therapeutic use, Hypertension, Primary localized osteoarthrosis of left shoulder, and Sleep apnea.     Chief Complaint:  Encounter for wellness exam    Well Adult Physical: Patient here for a comprehensive physical exam.     MUSCULOSKELETAL:  He reports left shoulder blade pain located just to the left of the spine, which he describes as muscular in nature. The current episode began Saturday night (4 days ago), reaching peak severity on Sunday, which he characterizes as the most unbearable episode to date. This is part of a recurring pattern, having experienced at least half a dozen similar episodes in the last 4-5 months. 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 He denies associated chest pain, neck pain, or shoulder joint pain. Symptoms have been  intermittent but this episode was notably more intense than previous occurrences. He reports taking muscle relaxers with no effect during this episode. Previously prescribed hydrocodone from November helped make the pain manageable, taking one pill per day.    MEDICATIONS:  He is currently taking Mounjaro and Metformin for type 2 diabetes which was initially prescribed by his cardiologist, Crestor, Omeprazole, and three blood pressure medications: valsartan, carvedilol, and amlodipine. He reports significant medication compliance challenges, forgetting to take the second dose of both Carvedilol and Metformin. He expresses interest in potential medication regimen simplification, specifically inquiring about the possibility of reducing dosing frequency. He indicates preference for pantoprazole over omeprazole for reflux management and appears motivated to maintain treatment.    Remaining chronic conditions have been reviewed and remain stable. Further detail as stated above.     No other complaints today.    Health Maintenance Topics with due status: Not Due       Topic Last Completion Date    TETANUS VACCINE 07/15/2021    Lipid Panel 03/03/2025    Colorectal Cancer Screening 04/17/2025    Hemoglobin A1c (Prediabetes) 04/30/2025    High Dose Statin 08/06/2025    Aspirin/Antiplatelet Therapy 08/06/2025    Influenza Vaccine Not Due    RSV Vaccine (Age 60+ and Pregnant patients) Not Due      ==============================================  Health Maintenance Due   Topic Date Due    Hepatitis C Screening  Never done    Shingles Vaccine (1 of 2) Never done    Pneumococcal Vaccines (Age 50+) (1 of 2 - PCV) Never done    COVID-19 Vaccine (3 - Pfizer risk series) 09/02/2021       Past Medical History:   Diagnosis Date    General anesthetics causing adverse effect in therapeutic use     hard to arouse    Hypertension     Primary localized osteoarthrosis of left shoulder     Sleep apnea      Past Surgical History:   Procedure  Laterality Date    ARTHROPLASTY OF SHOULDER Left 12/17/2020    Procedure: ARTHROPLASTY, SHOULDER;  Surgeon: Horacio Rodríguez II, MD;  Location: [facility] OR;  Service: Orthopedics;  Laterality: Left;    HERNIA REPAIR  2016    INSERTION, NEUROSTIMULATOR, HYPOGLOSSAL Right 10/12/2023    Procedure: INSERTION,NEUROSTIMULATOR,HYPOGLOSSAL;  Surgeon: Roman Macias MD;  Location: [facility] OR;  Service: ENT;  Laterality: Right;    JOINT REPLACEMENT  2021    LAPAROSCOPIC CHOLECYSTECTOMY N/A 07/21/2021    Procedure: CHOLECYSTECTOMY, LAPAROSCOPIC;  Surgeon: Sally Yan MD;  Location: [facility] OR;  Service: General;  Laterality: N/A;    LAPAROSCOPIC REPAIR OF INGUINAL HERNIA Bilateral 2009    SHOULDER SURGERY Left 02/18/2019    posterior labral repair; extensive debridement of synovium and cuff with SAD    SLEEP ENDOSCOPY, DRUG-INDUCED Bilateral 06/02/2023    Procedure: SLEEP ENDOSCOPY,DRUG-INDUCED;  Surgeon: Roman Macias MD;  Location: [facility] OR;  Service: ENT;  Laterality: Bilateral;     Review of patient's allergies indicates:   Allergen Reactions    Pcn [penicillins]      As a child, okay to take Keflex per patient     Medications Ordered Prior to Encounter[1]  Social History[2]  Family History   Problem Relation Name Age of Onset    Early death Mother Nelida Mejia 26        malpractice    Heart disease Father Rolando Bolton     Hypertension Father Rolando Bolton     Hyperlipidemia Father Rolando Bolton     Valvular heart disease Father Rolando Bolton        Review of Systems   Constitutional:  Negative for chills, fever and malaise/fatigue.   Respiratory:  Negative for cough and shortness of breath.    Cardiovascular:  Negative for chest pain, palpitations and leg swelling.   Gastrointestinal:  Negative for abdominal pain, constipation and diarrhea.   Genitourinary:  Negative for dysuria and frequency.   Musculoskeletal:  Positive for myalgias. Negative for back pain and joint pain.   Neurological:  Negative for headaches.    Psychiatric/Behavioral:  Negative for depression. The patient is not nervous/anxious.            Objective:      Vitals:    08/06/25 0818   BP: 128/84   Pulse: 85   Resp: 18     Body mass index is 31.02 kg/m².     Physical Exam  Constitutional:       General: He is not in acute distress.     Appearance: Normal appearance. He is well-developed.   HENT:      Head: Normocephalic and atraumatic.   Eyes:      Conjunctiva/sclera: Conjunctivae normal.   Cardiovascular:      Rate and Rhythm: Normal rate and regular rhythm.      Pulses: Normal pulses.      Heart sounds: Normal heart sounds. No murmur heard.  Pulmonary:      Effort: Pulmonary effort is normal. No respiratory distress.      Breath sounds: Normal breath sounds.   Abdominal:      General: Bowel sounds are normal. There is no distension.      Palpations: Abdomen is soft.      Tenderness: There is no abdominal tenderness.   Musculoskeletal:         General: Normal range of motion.      Left shoulder: Tenderness (shoulder blade) present. No swelling or deformity. Normal range of motion.      Cervical back: Normal range of motion and neck supple.      Thoracic back: Tenderness (L side) present. No swelling, deformity or bony tenderness. Normal range of motion.   Skin:     General: Skin is warm and dry.      Findings: No rash.   Neurological:      General: No focal deficit present.      Mental Status: He is alert and oriented to person, place, and time.   Psychiatric:         Mood and Affect: Mood normal.         Behavior: Behavior normal.             All labs, imaging and procedures performed since last visit have been personally reviewed.    This note was generated with the assistance of ambient listening technology. Verbal consent was obtained by the patient and accompanying visitor(s) for the recording of patient appointment to facilitate this note. I attest to having reviewed and edited the generated note for accuracy, though some syntax or spelling errors may  persist. Please contact the author of this note for any clarification.           [1]   Current Outpatient Medications on File Prior to Visit   Medication Sig Dispense Refill    aspirin (ECOTRIN) 81 MG EC tablet Take 81 mg by mouth once daily.      sildenafiL (VIAGRA) 100 MG tablet Take 1 tablet (100 mg total) by mouth once daily. 30 tablet 11    tadalafiL (CIALIS) 20 MG Tab Take 1 tablet (20 mg total) by mouth daily as needed (Take 30 minutes prior to sexual activity.). 30 tablet 11    [DISCONTINUED] amLODIPine (NORVASC) 10 MG tablet Take 1 tablet (10 mg total) by mouth once daily. 90 tablet 3    [DISCONTINUED] carvediloL (COREG) 25 MG tablet Take 25 mg by mouth 2 (two) times daily with meals.      [DISCONTINUED] metFORMIN (GLUCOPHAGE) 500 MG tablet Take 500 mg by mouth 2 (two) times daily.      [DISCONTINUED] pantoprazole (PROTONIX) 40 MG tablet Take 1 tablet (40 mg total) by mouth once daily. 90 tablet 3    [DISCONTINUED] rosuvastatin (CRESTOR) 10 MG tablet Take 1 tablet (10 mg total) by mouth once daily. 90 tablet 3    [DISCONTINUED] tirzepatide 7.5 mg/0.5 mL PnIj Inject 7.5 mg into the skin every 7 days. 6 mL 3    [DISCONTINUED] valsartan (DIOVAN) 320 MG tablet Take 320 mg by mouth once daily.       Current Facility-Administered Medications on File Prior to Visit   Medication Dose Route Frequency Provider Last Rate Last Admin    HYDROmorphone injection 0.5 mg  0.5 mg Intravenous Q5 Min PRN Volpi-Abadie, Jacqueline, MD        lactated ringers infusion   Intravenous Continuous Volpi-Abadie, Jacqueline, MD   Stopped at 10/12/23 1304    LIDOcaine (PF) 10 mg/ml (1%) injection 1 mg  0.1 mL Intradermal Once Volpi-Abadie, Jacqueline, MD        LORazepam injection 0.25 mg  0.25 mg Intravenous Q15 Min PRN Volpi-Abadie, Jacqueline, MD        oxyCODONE-acetaminophen 5-325 mg per tablet 1 tablet  1 tablet Oral Q3H PRN Volpi-Abadie, Jacqueline, MD        sodium chloride 0.9% flush 3 mL  3 mL Intravenous PRN Volpi-Abadie,  MD Mitzy       [2]   Social History  Socioeconomic History    Marital status:    Tobacco Use    Smoking status: Never    Smokeless tobacco: Never   Substance and Sexual Activity    Alcohol use: Yes     Alcohol/week: 1.0 standard drink of alcohol     Types: 1 Glasses of wine per week    Drug use: No    Sexual activity: Yes     Partners: Female   Social History Narrative    ** Merged History Encounter **          Social Drivers of Health     Financial Resource Strain: Low Risk  (7/1/2025)    Overall Financial Resource Strain (CARDIA)     Difficulty of Paying Living Expenses: Not hard at all   Food Insecurity: No Food Insecurity (7/1/2025)    Hunger Vital Sign     Worried About Running Out of Food in the Last Year: Never true     Ran Out of Food in the Last Year: Never true   Transportation Needs: No Transportation Needs (7/1/2025)    PRAPARE - Transportation     Lack of Transportation (Medical): No     Lack of Transportation (Non-Medical): No   Physical Activity: Inactive (7/1/2025)    Exercise Vital Sign     Days of Exercise per Week: 0 days     Minutes of Exercise per Session: 10 min   Stress: No Stress Concern Present (7/1/2025)    [employer] [place] [employer] - Occupational Stress Questionnaire     Feeling of Stress : Only a little   Housing Stability: Low Risk  (7/1/2025)    Housing Stability Vital Sign     Unable to Pay for Housing in the Last Year: No     Number of Times Moved in the Last Year: 0     Homeless in the Last Year: No

## 2025-09-02 ENCOUNTER — TELEPHONE (OUTPATIENT)
Dept: FAMILY MEDICINE | Facility: CLINIC | Age: 55
End: 2025-09-02
Payer: COMMERCIAL

## 2025-09-02 DIAGNOSIS — K64.9 HEMORRHOIDS, UNSPECIFIED HEMORRHOID TYPE: Primary | ICD-10-CM

## 2025-09-02 RX ORDER — HYDROCORTISONE 25 MG/G
CREAM TOPICAL 2 TIMES DAILY
Qty: 28 G | Refills: 1 | Status: SHIPPED | OUTPATIENT
Start: 2025-09-02

## (undated) DEVICE — KIT SAHARA DRAPE DRAW/LIFT

## (undated) DEVICE — GLOVE PROTEXIS HYDROGEL SZ7

## (undated) DEVICE — COVER PROXIMA MAYO STAND

## (undated) DEVICE — STRAP OR TABLE 5IN X 72IN

## (undated) DEVICE — KIT ANTIFOG